# Patient Record
Sex: MALE | Race: WHITE | NOT HISPANIC OR LATINO | URBAN - METROPOLITAN AREA
[De-identification: names, ages, dates, MRNs, and addresses within clinical notes are randomized per-mention and may not be internally consistent; named-entity substitution may affect disease eponyms.]

---

## 2023-01-24 PROBLEM — M25.512 LEFT SHOULDER PAIN: Status: ACTIVE | Noted: 2023-01-24

## 2023-01-24 PROBLEM — M25.539 WRIST PAIN: Status: ACTIVE | Noted: 2023-01-24

## 2023-01-24 PROBLEM — H52.4 PRESBYOPIA: Status: ACTIVE | Noted: 2023-01-24

## 2023-01-24 PROBLEM — R07.9 CHEST PAIN: Status: ACTIVE | Noted: 2023-01-24

## 2023-01-24 PROBLEM — I10 HYPERTENSION: Status: ACTIVE | Noted: 2023-01-24

## 2023-01-24 PROBLEM — Z97.3 WEARS READING EYEGLASSES: Status: ACTIVE | Noted: 2023-01-24

## 2023-01-24 PROBLEM — N52.9 ED (ERECTILE DYSFUNCTION): Status: ACTIVE | Noted: 2023-01-24

## 2023-01-24 PROBLEM — H53.8 FLASHING LIGHTS SEEN: Status: ACTIVE | Noted: 2023-01-24

## 2023-01-24 PROBLEM — M19.90 OSTEOARTHRITIS: Status: ACTIVE | Noted: 2023-01-24

## 2023-01-24 PROBLEM — K64.9 HEMORRHOID: Status: ACTIVE | Noted: 2023-01-24

## 2023-01-24 PROBLEM — R53.83 FATIGUE: Status: ACTIVE | Noted: 2023-01-24

## 2023-01-24 PROBLEM — F32.A DEPRESSION: Status: ACTIVE | Noted: 2023-01-24

## 2023-01-24 PROBLEM — D22.9 NUMEROUS MOLES: Status: ACTIVE | Noted: 2023-01-24

## 2023-01-24 PROBLEM — N48.6 PEYRONIE DISEASE: Status: ACTIVE | Noted: 2023-01-24

## 2023-01-24 PROBLEM — E78.5 HYPERLIPIDEMIA: Status: ACTIVE | Noted: 2023-01-24

## 2023-01-24 PROBLEM — M25.521 RIGHT ELBOW PAIN: Status: ACTIVE | Noted: 2023-01-24

## 2023-01-24 PROBLEM — J20.9 ACUTE BRONCHITIS: Status: ACTIVE | Noted: 2023-01-24

## 2023-01-24 PROBLEM — H52.7 REFRACTIVE ERROR: Status: ACTIVE | Noted: 2023-01-24

## 2023-01-24 PROBLEM — M54.2 NECK PAIN: Status: ACTIVE | Noted: 2023-01-24

## 2023-01-24 PROBLEM — R10.9 ABDOMINAL PAIN: Status: ACTIVE | Noted: 2023-01-24

## 2023-01-24 PROBLEM — E56.9 VITAMIN DEFICIENCY: Status: ACTIVE | Noted: 2023-01-24

## 2023-01-24 PROBLEM — R32 URINARY INCONTINENCE: Status: ACTIVE | Noted: 2023-01-24

## 2023-01-24 PROBLEM — H25.813 MIXED TYPE AGE-RELATED CATARACT, BOTH EYES: Status: ACTIVE | Noted: 2023-01-24

## 2023-01-24 PROBLEM — H43.812 PVD (POSTERIOR VITREOUS DETACHMENT), LEFT EYE: Status: ACTIVE | Noted: 2023-01-24

## 2023-01-24 PROBLEM — M18.11 LOCALIZED PRIMARY OSTEOARTHRITIS OF CARPOMETACARPAL JOINT OF RIGHT THUMB: Status: ACTIVE | Noted: 2023-01-24

## 2023-01-24 PROBLEM — H25.13 CATARACT, NUCLEAR SCLEROTIC, BOTH EYES: Status: ACTIVE | Noted: 2023-01-24

## 2023-01-24 PROBLEM — H43.819 POSTERIOR VITREOUS DETACHMENT: Status: ACTIVE | Noted: 2023-01-24

## 2023-01-24 RX ORDER — FLUTICASONE PROPIONATE AND SALMETEROL 100; 50 UG/1; UG/1
1 POWDER RESPIRATORY (INHALATION) EVERY 12 HOURS
COMMUNITY
End: 2023-04-21

## 2023-01-24 RX ORDER — ALBUTEROL SULFATE 90 UG/1
1 AEROSOL, METERED RESPIRATORY (INHALATION) EVERY 4 HOURS PRN
COMMUNITY
End: 2023-11-26

## 2023-01-24 RX ORDER — TAMSULOSIN HYDROCHLORIDE 0.4 MG/1
0.4 CAPSULE ORAL NIGHTLY
COMMUNITY
Start: 2018-08-21 | End: 2023-05-04

## 2023-01-24 RX ORDER — OMEPRAZOLE 20 MG/1
20 TABLET, DELAYED RELEASE ORAL
COMMUNITY

## 2023-01-24 RX ORDER — TADALAFIL 5 MG/1
5 TABLET ORAL AS NEEDED
COMMUNITY
Start: 2019-06-06 | End: 2023-03-09 | Stop reason: ALTCHOICE

## 2023-01-24 RX ORDER — LOSARTAN POTASSIUM 50 MG/1
50 TABLET ORAL DAILY
COMMUNITY
Start: 2019-07-02 | End: 2023-05-04

## 2023-01-24 RX ORDER — MONTELUKAST SODIUM 10 MG/1
10 TABLET ORAL DAILY
COMMUNITY
End: 2023-05-04

## 2023-01-24 RX ORDER — ROSUVASTATIN CALCIUM 10 MG/1
10 TABLET, COATED ORAL NIGHTLY
COMMUNITY
End: 2023-05-04

## 2023-03-08 ENCOUNTER — APPOINTMENT (OUTPATIENT)
Dept: PRIMARY CARE | Facility: CLINIC | Age: 68
End: 2023-03-08

## 2023-03-09 ENCOUNTER — OFFICE VISIT (OUTPATIENT)
Dept: PRIMARY CARE | Facility: CLINIC | Age: 68
End: 2023-03-09
Payer: COMMERCIAL

## 2023-03-09 VITALS
SYSTOLIC BLOOD PRESSURE: 122 MMHG | TEMPERATURE: 96.4 F | BODY MASS INDEX: 34.43 KG/M2 | HEART RATE: 80 BPM | HEIGHT: 72 IN | DIASTOLIC BLOOD PRESSURE: 69 MMHG | RESPIRATION RATE: 14 BRPM | WEIGHT: 254.2 LBS

## 2023-03-09 DIAGNOSIS — E78.5 HYPERLIPIDEMIA, UNSPECIFIED HYPERLIPIDEMIA TYPE: ICD-10-CM

## 2023-03-09 DIAGNOSIS — N48.6 PEYRONIE DISEASE: Primary | ICD-10-CM

## 2023-03-09 DIAGNOSIS — I10 PRIMARY HYPERTENSION: ICD-10-CM

## 2023-03-09 DIAGNOSIS — G56.00 CARPAL TUNNEL SYNDROME, UNSPECIFIED LATERALITY: ICD-10-CM

## 2023-03-09 PROCEDURE — 3078F DIAST BP <80 MM HG: CPT | Performed by: INTERNAL MEDICINE

## 2023-03-09 PROCEDURE — 3074F SYST BP LT 130 MM HG: CPT | Performed by: INTERNAL MEDICINE

## 2023-03-09 PROCEDURE — 1159F MED LIST DOCD IN RCRD: CPT | Performed by: INTERNAL MEDICINE

## 2023-03-09 PROCEDURE — 1036F TOBACCO NON-USER: CPT | Performed by: INTERNAL MEDICINE

## 2023-03-09 PROCEDURE — 99213 OFFICE O/P EST LOW 20 MIN: CPT | Performed by: INTERNAL MEDICINE

## 2023-03-09 RX ORDER — TRIAMCINOLONE ACETONIDE 1 MG/G
CREAM TOPICAL
COMMUNITY
Start: 2023-01-21 | End: 2023-09-14 | Stop reason: ALTCHOICE

## 2023-03-09 RX ORDER — LOSARTAN POTASSIUM 25 MG/1
1 TABLET ORAL DAILY
COMMUNITY
Start: 2022-11-13 | End: 2023-03-09 | Stop reason: ALTCHOICE

## 2023-03-09 RX ORDER — MUPIROCIN 20 MG/G
OINTMENT TOPICAL
COMMUNITY
Start: 2023-01-21 | End: 2023-09-14 | Stop reason: ALTCHOICE

## 2023-03-09 RX ORDER — MELOXICAM 15 MG/1
15 TABLET ORAL DAILY
COMMUNITY
Start: 2023-02-15 | End: 2023-12-05

## 2023-03-09 ASSESSMENT — PATIENT HEALTH QUESTIONNAIRE - PHQ9
1. LITTLE INTEREST OR PLEASURE IN DOING THINGS: NOT AT ALL
SUM OF ALL RESPONSES TO PHQ9 QUESTIONS 1 AND 2: 0
2. FEELING DOWN, DEPRESSED OR HOPELESS: NOT AT ALL

## 2023-03-09 ASSESSMENT — PAIN SCALES - GENERAL: PAINLEVEL: 0-NO PAIN

## 2023-03-09 NOTE — PROGRESS NOTES
The patient is here for:   Chief Complaint   Patient presents with    3 month Routine Check Up     Per patient      Patient's PCP is: Brian Alves MD    I have reviewed existing histories, notes, past medical history, surgical history, social history, family history, med list, allergy list, and immunization, and updated if applicable.    HPI:  Follow up weight and bp. He started cutting back on sugar salt, and has started walking on treadmill. Feels well. Not Weight loss yet. But is hopefull  Labs 3 months ago showed pre dm  Pt requested referrel to see urology for Peyronie disease        Additional ROS: recently dx of cts, wearing brace at night, helping. Spider bite on left thigh and left hand, went to urgent care and is getting better    Physical exam:  /69 (BP Location: Left arm, Patient Position: Sitting, BP Cuff Size: Large adult)   Pulse 80   Temp 35.8 °C (96.4 °F) (Temporal)   Resp 14   Ht 1.829 m (6')   Wt 115 kg (254 lb 3.2 oz)   BMI 34.48 kg/m²    Patient appears well, alert and oriented x 3, cooperative. No depression or anxiety.   Vitals are as documented.  Neck is supple and free of adenopathy, or masses. No thyromegaly.   PERRL, extra eye muscles are intact.  Ears, throat are normal.  Heart sounds are normal, no murmur, gallops or rubs.   Lungs are clear to auscultation    Abdomen is soft, no tenderness, masses or organomegaly.  Extremities are normal. Peripheral pulses are normal.   Neurologic exam is normal without focal findings.   Skin is normal without suspicious lesions noted.   No acute joint swelling or pain.      Assessments/orders:   1. Primary hypertension  controlled    2. Peyronie disease     - Referral to Urology; Future    3. Hyperlipidemia, unspecified hyperlipidemia type   Continue diet and exercise                  Follow up 6 months

## 2023-03-09 NOTE — PROGRESS NOTES
Patient identified by name and date of birth. Vitals taken, allergies, medications and immunizations reviewed.    Patient ready to be seen by provider.

## 2023-04-20 DIAGNOSIS — J45.909 UNSPECIFIED ASTHMA, UNCOMPLICATED (HHS-HCC): ICD-10-CM

## 2023-04-21 RX ORDER — CEPHALEXIN 250 MG/1
CAPSULE ORAL
Qty: 60 EACH | Refills: 3 | Status: SHIPPED | OUTPATIENT
Start: 2023-04-21 | End: 2024-01-21

## 2023-05-02 DIAGNOSIS — E78.5 HYPERLIPIDEMIA, UNSPECIFIED: ICD-10-CM

## 2023-05-02 DIAGNOSIS — R32 UNSPECIFIED URINARY INCONTINENCE: ICD-10-CM

## 2023-05-02 DIAGNOSIS — J45.990 EXERCISE INDUCED BRONCHOSPASM (HHS-HCC): ICD-10-CM

## 2023-05-02 DIAGNOSIS — I10 ESSENTIAL (PRIMARY) HYPERTENSION: ICD-10-CM

## 2023-05-04 RX ORDER — TAMSULOSIN HYDROCHLORIDE 0.4 MG/1
CAPSULE ORAL
Qty: 90 CAPSULE | Refills: 1 | Status: SHIPPED | OUTPATIENT
Start: 2023-05-04

## 2023-05-04 RX ORDER — ROSUVASTATIN CALCIUM 10 MG/1
TABLET, COATED ORAL
Qty: 90 TABLET | Refills: 1 | Status: SHIPPED | OUTPATIENT
Start: 2023-05-04 | End: 2023-11-26

## 2023-05-04 RX ORDER — LOSARTAN POTASSIUM 50 MG/1
TABLET ORAL
Qty: 90 TABLET | Refills: 1 | Status: SHIPPED | OUTPATIENT
Start: 2023-05-04 | End: 2023-11-26

## 2023-05-04 RX ORDER — MONTELUKAST SODIUM 10 MG/1
TABLET ORAL
Qty: 90 TABLET | Refills: 1 | Status: SHIPPED | OUTPATIENT
Start: 2023-05-04 | End: 2023-11-26

## 2023-07-07 LAB
APPEARANCE, URINE: CLEAR
BILIRUBIN, URINE: NEGATIVE
BLOOD, URINE: NEGATIVE
COLOR, URINE: YELLOW
GLUCOSE, URINE: NEGATIVE MG/DL
KETONES, URINE: NEGATIVE MG/DL
LEUKOCYTE ESTERASE, URINE: NEGATIVE
NITRITE, URINE: NEGATIVE
PH, URINE: 6 (ref 5–8)
PROTEIN, URINE: NEGATIVE MG/DL
SPECIFIC GRAVITY, URINE: 1.03 (ref 1–1.03)
UROBILINOGEN, URINE: <2 MG/DL (ref 0–1.9)

## 2023-07-08 LAB — URINE CULTURE: NO GROWTH

## 2023-09-05 PROBLEM — R35.0 URINARY FREQUENCY: Status: ACTIVE | Noted: 2023-09-05

## 2023-09-05 PROBLEM — J45.990 EXERCISE-INDUCED ASTHMA (HHS-HCC): Status: ACTIVE | Noted: 2023-09-05

## 2023-09-05 PROBLEM — R35.0 BENIGN PROSTATIC HYPERPLASIA WITH URINARY FREQUENCY: Status: ACTIVE | Noted: 2023-09-05

## 2023-09-05 PROBLEM — R20.2 NUMBNESS AND TINGLING IN BOTH HANDS: Status: ACTIVE | Noted: 2023-09-05

## 2023-09-05 PROBLEM — K21.9 GERD WITHOUT ESOPHAGITIS: Status: ACTIVE | Noted: 2023-09-05

## 2023-09-05 PROBLEM — M19.049 HAND ARTHRITIS: Status: ACTIVE | Noted: 2023-09-05

## 2023-09-05 PROBLEM — E66.811 OBESITY (BMI 30.0-34.9): Status: ACTIVE | Noted: 2023-09-05

## 2023-09-05 PROBLEM — E66.9 OBESITY (BMI 30.0-34.9): Status: ACTIVE | Noted: 2023-09-05

## 2023-09-05 PROBLEM — J45.909 ASTHMA (HHS-HCC): Status: ACTIVE | Noted: 2023-09-05

## 2023-09-05 PROBLEM — M25.561 KNEE PAIN, BILATERAL: Status: ACTIVE | Noted: 2023-09-05

## 2023-09-05 PROBLEM — N40.1 BENIGN PROSTATIC HYPERPLASIA WITH URINARY FREQUENCY: Status: ACTIVE | Noted: 2023-09-05

## 2023-09-05 PROBLEM — M25.519 SHOULDER PAIN: Status: ACTIVE | Noted: 2023-09-05

## 2023-09-05 PROBLEM — R20.0 NUMBNESS AND TINGLING IN BOTH HANDS: Status: ACTIVE | Noted: 2023-09-05

## 2023-09-05 PROBLEM — N39.41 URGENCY INCONTINENCE: Status: ACTIVE | Noted: 2023-09-05

## 2023-09-05 PROBLEM — M25.562 KNEE PAIN, BILATERAL: Status: ACTIVE | Noted: 2023-09-05

## 2023-09-07 ENCOUNTER — HOSPITAL ENCOUNTER (OUTPATIENT)
Dept: DATA CONVERSION | Facility: HOSPITAL | Age: 68
End: 2023-09-07
Attending: STUDENT IN AN ORGANIZED HEALTH CARE EDUCATION/TRAINING PROGRAM | Admitting: STUDENT IN AN ORGANIZED HEALTH CARE EDUCATION/TRAINING PROGRAM
Payer: COMMERCIAL

## 2023-09-07 DIAGNOSIS — K62.89 OTHER SPECIFIED DISEASES OF ANUS AND RECTUM: ICD-10-CM

## 2023-09-07 DIAGNOSIS — Z12.11 ENCOUNTER FOR SCREENING FOR MALIGNANT NEOPLASM OF COLON: ICD-10-CM

## 2023-09-07 DIAGNOSIS — Z86.010 PERSONAL HISTORY OF COLONIC POLYPS: ICD-10-CM

## 2023-09-07 DIAGNOSIS — K64.4 RESIDUAL HEMORRHOIDAL SKIN TAGS: ICD-10-CM

## 2023-09-07 DIAGNOSIS — K63.5 POLYP OF COLON: ICD-10-CM

## 2023-09-07 DIAGNOSIS — K64.8 OTHER HEMORRHOIDS: ICD-10-CM

## 2023-09-07 DIAGNOSIS — D12.2 BENIGN NEOPLASM OF ASCENDING COLON: ICD-10-CM

## 2023-09-07 DIAGNOSIS — K57.30 DIVERTICULOSIS OF LARGE INTESTINE WITHOUT PERFORATION OR ABSCESS WITHOUT BLEEDING: ICD-10-CM

## 2023-09-07 DIAGNOSIS — D12.5 BENIGN NEOPLASM OF SIGMOID COLON: ICD-10-CM

## 2023-09-07 DIAGNOSIS — D12.3 BENIGN NEOPLASM OF TRANSVERSE COLON: ICD-10-CM

## 2023-09-13 LAB
COMPLETE PATHOLOGY REPORT: NORMAL
CONVERTED CLINICAL DIAGNOSIS-HISTORY: NORMAL
CONVERTED FINAL DIAGNOSIS: NORMAL
CONVERTED FINAL REPORT PDF LINK TO COPY AND PASTE: NORMAL
CONVERTED GROSS DESCRIPTION: NORMAL

## 2023-09-13 RX ORDER — TROSPIUM CHLORIDE ER 60 MG/1
60 CAPSULE ORAL DAILY
COMMUNITY
Start: 2023-08-30 | End: 2024-04-16 | Stop reason: WASHOUT

## 2023-09-13 RX ORDER — CHLORHEXIDINE GLUCONATE ORAL RINSE 1.2 MG/ML
SOLUTION DENTAL
COMMUNITY
Start: 2023-06-28 | End: 2023-09-14 | Stop reason: ALTCHOICE

## 2023-09-13 RX ORDER — TADALAFIL 5 MG/1
5 TABLET ORAL DAILY
COMMUNITY
Start: 2023-08-07 | End: 2024-01-23 | Stop reason: SDUPTHER

## 2023-09-14 ENCOUNTER — APPOINTMENT (OUTPATIENT)
Dept: PRIMARY CARE | Facility: CLINIC | Age: 68
End: 2023-09-14
Payer: COMMERCIAL

## 2023-09-14 ENCOUNTER — OFFICE VISIT (OUTPATIENT)
Dept: PRIMARY CARE | Facility: CLINIC | Age: 68
End: 2023-09-14
Payer: COMMERCIAL

## 2023-09-14 VITALS
TEMPERATURE: 98.3 F | OXYGEN SATURATION: 94 % | HEIGHT: 72 IN | HEART RATE: 81 BPM | SYSTOLIC BLOOD PRESSURE: 156 MMHG | WEIGHT: 264 LBS | BODY MASS INDEX: 35.76 KG/M2 | DIASTOLIC BLOOD PRESSURE: 85 MMHG

## 2023-09-14 DIAGNOSIS — E78.5 HYPERLIPIDEMIA, UNSPECIFIED HYPERLIPIDEMIA TYPE: ICD-10-CM

## 2023-09-14 DIAGNOSIS — Z12.5 SCREENING FOR PROSTATE CANCER: ICD-10-CM

## 2023-09-14 DIAGNOSIS — E66.9 OBESITY (BMI 30.0-34.9): ICD-10-CM

## 2023-09-14 DIAGNOSIS — F33.9 DEPRESSION, RECURRENT (CMS-HCC): ICD-10-CM

## 2023-09-14 DIAGNOSIS — I10 PRIMARY HYPERTENSION: ICD-10-CM

## 2023-09-14 DIAGNOSIS — R73.03 PRE-DIABETES: ICD-10-CM

## 2023-09-14 DIAGNOSIS — G47.33 OSA (OBSTRUCTIVE SLEEP APNEA): Primary | ICD-10-CM

## 2023-09-14 PROCEDURE — 3077F SYST BP >= 140 MM HG: CPT | Performed by: INTERNAL MEDICINE

## 2023-09-14 PROCEDURE — 1159F MED LIST DOCD IN RCRD: CPT | Performed by: INTERNAL MEDICINE

## 2023-09-14 PROCEDURE — 1036F TOBACCO NON-USER: CPT | Performed by: INTERNAL MEDICINE

## 2023-09-14 PROCEDURE — 3079F DIAST BP 80-89 MM HG: CPT | Performed by: INTERNAL MEDICINE

## 2023-09-14 PROCEDURE — 1126F AMNT PAIN NOTED NONE PRSNT: CPT | Performed by: INTERNAL MEDICINE

## 2023-09-14 PROCEDURE — 99214 OFFICE O/P EST MOD 30 MIN: CPT | Performed by: INTERNAL MEDICINE

## 2023-09-14 ASSESSMENT — ENCOUNTER SYMPTOMS
DEPRESSION: 0
OCCASIONAL FEELINGS OF UNSTEADINESS: 0
LOSS OF SENSATION IN FEET: 0

## 2023-09-14 NOTE — PROGRESS NOTES
PCP: Brian Alves MD   I have reviewed existing histories, notes, past medical history, surgical history, social history, family history, med list, allergy list, and immunization, and updated.    HPI:   Had Colonoscopy recently, showed adenoma. Go back in 5 years. During procedure, anesthesiologist noted Sleep apnea. They referred him to see sleep med. He is to schedule appointment     Right thumb OA, getting bad. Sees hand ortho. Will call them to schedule  Gained weight.  Difficulty following diet.  Will try to do better  He recently joined a gym.      Lab Results   Component Value Date    WBC 8.4 12/28/2022    HGB 14.1 12/28/2022    HCT 42.6 12/28/2022     12/28/2022    CHOL 152 12/28/2022    TRIG 142 12/28/2022    HDL 34.3 (A) 12/28/2022    ALT 23 12/28/2022    AST 17 12/28/2022     12/28/2022    K 4.4 12/28/2022     12/28/2022    CREATININE 1.07 12/28/2022    BUN 22 12/28/2022    CO2 28 12/28/2022    TSH 1.05 09/30/2021    PSA 2.48 02/19/2020    HGBA1C 6.2 (A) 12/28/2022     Physical exam:  /85   Pulse 81   Temp 36.8 °C (98.3 °F)   Ht 1.829 m (6')   Wt 120 kg (264 lb)   SpO2 94%   BMI 35.80 kg/m²     NAD  No carotid bruit  Neck exam showed no mass, lymphadenopathy, or thyroid enlargement, and no carotid bruit.  Heart exam showed normal heart sounds, no murmur, clicks, gallops or rubs. Regular rate and rhythm. Chest is clear; no wheezes or rales.  No leg edema  Central obesity    Assessments/orders:   1. SERGIO (obstructive sleep apnea)  CBC      2. Obesity (BMI 30.0-34.9)  CBC      3. Hyperlipidemia, unspecified hyperlipidemia type  CBC, Comprehensive Metabolic Panel, Lipid Panel      4. Primary hypertension  CBC, Comprehensive Metabolic Panel      5. Screening for prostate cancer  Prostate Specific Antigen, Screen      6. Pre-diabetes  CBC, Hemoglobin A1C      7. Depression, recurrent (CMS/HCC)

## 2023-09-29 VITALS — WEIGHT: 264.55 LBS | BODY MASS INDEX: 33.97 KG/M2

## 2023-10-24 DIAGNOSIS — M25.512 PAIN IN LEFT SHOULDER: ICD-10-CM

## 2023-10-24 RX ORDER — MELOXICAM 15 MG/1
15 TABLET ORAL DAILY
Qty: 90 TABLET | OUTPATIENT
Start: 2023-10-24

## 2023-11-02 ENCOUNTER — OFFICE VISIT (OUTPATIENT)
Dept: SLEEP MEDICINE | Facility: CLINIC | Age: 68
End: 2023-11-02
Payer: COMMERCIAL

## 2023-11-02 VITALS
SYSTOLIC BLOOD PRESSURE: 128 MMHG | DIASTOLIC BLOOD PRESSURE: 82 MMHG | OXYGEN SATURATION: 95 % | BODY MASS INDEX: 36.14 KG/M2 | WEIGHT: 266.8 LBS | HEIGHT: 72 IN | HEART RATE: 95 BPM

## 2023-11-02 DIAGNOSIS — R29.818 SUSPECTED SLEEP APNEA: Primary | ICD-10-CM

## 2023-11-02 PROCEDURE — 3074F SYST BP LT 130 MM HG: CPT | Performed by: NURSE PRACTITIONER

## 2023-11-02 PROCEDURE — 1160F RVW MEDS BY RX/DR IN RCRD: CPT | Performed by: NURSE PRACTITIONER

## 2023-11-02 PROCEDURE — 99204 OFFICE O/P NEW MOD 45 MIN: CPT | Performed by: NURSE PRACTITIONER

## 2023-11-02 PROCEDURE — 3079F DIAST BP 80-89 MM HG: CPT | Performed by: NURSE PRACTITIONER

## 2023-11-02 PROCEDURE — 1036F TOBACCO NON-USER: CPT | Performed by: NURSE PRACTITIONER

## 2023-11-02 PROCEDURE — 1126F AMNT PAIN NOTED NONE PRSNT: CPT | Performed by: NURSE PRACTITIONER

## 2023-11-02 PROCEDURE — 1159F MED LIST DOCD IN RCRD: CPT | Performed by: NURSE PRACTITIONER

## 2023-11-02 PROCEDURE — 99214 OFFICE O/P EST MOD 30 MIN: CPT | Performed by: NURSE PRACTITIONER

## 2023-11-02 ASSESSMENT — SLEEP AND FATIGUE QUESTIONNAIRES
ESS-CHAD TOTAL SCORE: 11
HOW LIKELY ARE YOU TO NOD OFF OR FALL ASLEEP WHILE SITTING AND TALKING TO SOMEONE: WOULD NEVER DOZE
HOW LIKELY ARE YOU TO NOD OFF OR FALL ASLEEP WHILE SITTING AND READING: MODERATE CHANCE OF DOZING
HOW LIKELY ARE YOU TO NOD OFF OR FALL ASLEEP WHEN YOU ARE A PASSENGER IN A CAR FOR AN HOUR WITHOUT A BREAK: MODERATE CHANCE OF DOZING
HOW LIKELY ARE YOU TO NOD OFF OR FALL ASLEEP WHILE LYING DOWN TO REST IN THE AFTERNOON WHEN CIRCUMSTANCES PERMIT: HIGH CHANCE OF DOZING
SLEEP_PROBLEM_NOTICEABLE_TO_OTHERS: MUCH
HOW LIKELY ARE YOU TO NOD OFF OR FALL ASLEEP WHILE WATCHING TV: MODERATE CHANCE OF DOZING
DIFFICULTY_STAYING_ASLEEP: SEVERE
SLEEP_PROBLEM_INTERFERES_DAILY_ACTIVITIES: VERY MUCH NOTICEABLE
SITING INACTIVE IN A PUBLIC PLACE LIKE A CLASS ROOM OR A MOVIE THEATER: MODERATE CHANCE OF DOZING
HOW LIKELY ARE YOU TO NOD OFF OR FALL ASLEEP IN A CAR, WHILE STOPPED FOR A FEW MINUTES IN TRAFFIC: WOULD NEVER DOZE
WAKING_TOO_EARLY: VERY SEVERE
WORRIED_DISTRESSED_DUE_TO_SLEEP: MUCH
HOW LIKELY ARE YOU TO NOD OFF OR FALL ASLEEP WHILE SITTING QUIETLY AFTER LUNCH WITHOUT ALCOHOL: WOULD NEVER DOZE
SATISFACTION_WITH_CURRENT_SLEEP_PATTERN: VERY DISSATISFIED

## 2023-11-02 ASSESSMENT — PAIN SCALES - GENERAL: PAINLEVEL: 0-NO PAIN

## 2023-11-02 ASSESSMENT — PATIENT HEALTH QUESTIONNAIRE - PHQ9
2. FEELING DOWN, DEPRESSED OR HOPELESS: NOT AT ALL
1. LITTLE INTEREST OR PLEASURE IN DOING THINGS: NOT AT ALL
SUM OF ALL RESPONSES TO PHQ9 QUESTIONS 1 AND 2: 0

## 2023-11-02 ASSESSMENT — ENCOUNTER SYMPTOMS
DEPRESSION: 0
LOSS OF SENSATION IN FEET: 0
OCCASIONAL FEELINGS OF UNSTEADINESS: 0

## 2023-11-02 ASSESSMENT — COLUMBIA-SUICIDE SEVERITY RATING SCALE - C-SSRS
6. HAVE YOU EVER DONE ANYTHING, STARTED TO DO ANYTHING, OR PREPARED TO DO ANYTHING TO END YOUR LIFE?: NO
1. IN THE PAST MONTH, HAVE YOU WISHED YOU WERE DEAD OR WISHED YOU COULD GO TO SLEEP AND NOT WAKE UP?: NO
2. HAVE YOU ACTUALLY HAD ANY THOUGHTS OF KILLING YOURSELF?: NO

## 2023-11-02 NOTE — PATIENT INSTRUCTIONS
I will be off on leave of absence January through April. Please schedule follow up with one of my colleagues if need to be seen during this time frame.    Dr. Dakota Vega, [place] [facility] Escalante or [place] and [place] Madrigal CNP- Chandan Vega and Rosa Peñaloza, ARIE, Chandan Vega, Whitney Guy. NP, Rogelio and [facility] Sleep Medicine  [facility] [id number] [facility] [address]       NAME: Hipolito Asencio   DATE:  11/02/23    DIAGNOSIS:   1. Suspected sleep apnea            Thank you for coming to the Sleep Medicine Clinic today! Your sleep medicine provider today was: HILARY Chong Below is a summary of your treatment plan, other important information, and our contact numbers:    TREATMENT PLAN:   - Get your sleep study scheduled  - Follow-up in 3-4 months.  - If not already done, sign up for 'My Chart' and send prescription requests or messages through this      Obstructive sleep apnea (SERGIO): SERGIO is a sleep disorder where your upper airway muscles relax during sleep and the airway intermittently and repetitively narrows and collapses leading to blocked airway (apnea) which, in turn, can disrupt breathing in sleep, lower oxygen levels while you sleep and cause night time wakings. Because apnea may cause higher carbon dioxide or low oxygen levels, untreated SERGIO can lead to heart arrhythmia, elevation of blood pressure, and make it harder for the body to consolidate memory and metabolize (leading to higher blood sugars at night).   Frequent partial arousals occur during sleep resulting in sleep deprivation and daytime sleepiness. SERGIO is associated with an increased risk of cardiovascular disease, stroke, hypertension, and insulin resistance. Moreover, untreated SERGIO with excessive daytime sleepiness can increase the risk of  motor vehicular accidents.    Some conservative strategies for SERGIO are:   Positional therapy - Avoid sleeping on your back.   Healthy diet, exercise, and optimizing weight encouraged.   Avoid alcohol late in the evening as it can make sleep apnea worse.     Safety: Avoid driving and operating heavy equipment while sleepy. Drowsy driving may lead to life-threatening motor vehicle accidents.     Common treatment options for sleep apnea include weight management, positional therapy, Positive Airway Therapy (PAP) therapy, oral appliance therapy, hypoglossal nerve stimulation, and select airway surgeries.     Instructions - Common SERGIO Recs: - For your sleep apnea, continue to use your PAP every night and use it whenever you are sleeping.   - Avoid alcohol or sedatives several hours prior to sleeping.   - Get additional supplies for your PAP (e.g., mask, hose, filters) every 3 months or as your insurance allows from your [employer] company. Replacement cushions for your PAP mask can be requested monthly if airseals are an issue.  - Remember to clean your mask, tubings, and water chamber regularly as instructed.  - Avoid driving or operating heavy machinery when drowsy. A person driving while sleepy is five (5) times more likely to have an accident. If you feel sleepy, pull over and take a short power nap (sleep for less than 30 minutes). Otherwise, ask somebody to drive you.    EASY WAYS TO IMPROVE YOUR SLEEP:  1. Go to bed and wake up at the same time every day.   Aim for 8 hours but some people need less, some need more.   Get out of bed if you are not sleeping.   Limit naps to 20 min or less.   2. Expose yourself to daylight and/ or bright light in the morning.   Go outside or spend time near a window each morning.   You can use a light box (found on Amazon) if you wake before the sunrise.   Limit light exposure in the evenings (including electronic usage).   Try meditation, reading, stretching, deep breathing, warm shower or  bath, or yoga nidra as part of your bedtime routine. There are many great FREE, videos or audio tracks on YouTube/ Lot[employer], etc for guidance.  3. Exercise, in some form, EVERY day, but not too close to bedtime. Consider making this part of your routine at the start of your day, followed by a cool shower.  4. Eat meals at roughly the same time every day. Make sure you are prioritizing fruits, vegetables, whole grains, lean proteins.  5. Time your caffeine intake. Make sure you are not drinking caffeine within 8 to 12 hours prior to your bedtime.   6. Avoid marijuana, alcohol, and nicotine. They will reduce sleep quality in any quantity.  7. Learn to manage anxiety. Psychology services at  can be reached at [phone] to schedule an appointment.     IMPORTANT INFORMATION:     Call [phone] for medical emergencies.  Our offices are generally open from Monday-Friday, 9 am - 5 pm.  If you need to get in touch with me, you may either call me and my team(number is below) or you can use [facility] How To.  If a referral for a test, for CPAP, or for another specialist was made, and you have not heard about scheduling this within a week, please call scheduling at [phone]).  If you are unable to make your appointment for clinic or an overnight study, kindly call the office at least 48 hours in advance to cancel and reschedule.  If you are on CPAP, please bring your device's card to each clinic appointment unless told otherwise by your provider.  There are no supporting services by either the sleep doctors or their staff on weekends and Holidays, or after 5 PM on weekdays.   If you have been asked to come to a sleep study, make sure you bring toiletries, a comfy pillow, and any nighttime medications that you may regularly take. Also be sure to eat dinner before you arrive. We generally do not provide meals.      PRESCRIPTIONS:  We require 7 days advanced notice for prescription refills. If we do not receive the request in this  time, we cannot guarantee that your medication will be refilled in time. Please contact the sleep nurses listed below for refills or request via [employer].     IMPORTANT PHONE NUMBERS:   Sleep Medicine Clinic Fax: [phone]  Appointments (for Pediatric Sleep Clinic): [phone] - option 1  Appointments (for Adult Sleep Clinic): [phone] - option 2  Appointments (For Sleep Studies): [phone] - option 3  Behavioral Sleep Medicine: [phone]  Sleep Surgery: [phone]  ENT (Otolaryngology): [phone]  Headache Clinic (Neurology): [phone]  Neurology: [phone]  Psychiatry: [phone]  Pulmonary Function Testing (PFT) Center: [phone]  Pulmonary Medicine: [phone]  [facility] (DME): [phone]  [facility] (DME): [phone]  [facility] (DME): [phone]    Our Adult Sleep Medicine Team (Please do not hesitate to call the office or sleep nurse with any questions between appointments):    Adult Sleep Nurses (Shakira Farrar, RN and Nikki Hernandez RN):  For clinical questions and refilling prescriptions: [phone]  Email sleep diaries and other documents at: [email or web address]    Adult Sleep Medicine Secretaries:  Divya Bob (For Neo/Fish/Krise/[place]ro[place]/Yeh/Madrigal):   P: [phone]  F: [phone]  Angelia Mack (For [place]): P: [phone]  Fax: [phone]  Katharine Wood (For Jurcevic/Blank): P: [phone]  F: [phone]  Shana Scruggs (For [place]): P: [phone]  F: [phone]  Jaclyn Tucker (For Lilia/Malena/Zakhary): P: [phone]  F: [phone]  Kandi Barone (For Boris/Long): P: [phone]  F: [phone]     Adult Sleep Medicine Advanced Practice Providers:  Ezra Tolentino ([place], [place])  Betzaida Guy ([facility])  Patricia Vanegas CNP ([facility])  Sonal Peñaloza CNP ([place], [place], [facility])  Jess Yadav ([facility],  "[place], [place])  Solo Alves CNP ([place], [place])      Our Sleep Testing Center (STC) Locations:  Our team will contact you to schedule your sleep study, however, you can contact us as follow:  Main Phone Line (scheduling only): [phone]), option 3  Adult and Pediatric Locations  [facility] (6 years and older): [facility] - 4th floor ([address], [facility]) After hours line: [phone]  [facility] (Main campus: All ages): [facility], 6th floor. After hours line: [phone]   [place] (5 years and older; younger considered on case-by-case basis): [address]; [address], [address]. Scheduling  After hours line: [phone]   [place] (6 years and older): [address]; [address]; [address]   [place] (6 years and older): [address]  After hours line: [phone]   [place] (13 years and older) in [place]: [address], 2nd floor  After hours line: [phone]  [facility] (13 year and older): [address]  After hours line: [phone] (Home studies out of [facility])    Adult Only Locations:   [place] (18 years and older): [address], 2nd floor   [place] (18 years and older): [address]; 4th floor  After hours line: [phone]  [facility] (18 years and older) at [place]: [address]  After hours line: [phone]        CONTACTING YOUR SLEEP MEDICINE PROVIDER:  Send a message directly to your provider through \"My Chart\", which is the email service through your  Records Account: [email or web address]   Call [phone] and leave a message. One of the administrative assistants will forward the message to your sleep medicine provider through \"My Chart\" and/or email.     Your sleep medicine provider for this visit was: LATHA Chong-CNP    In the event that " you are running more than 15 minutes late to your appointment, I will kindly ask you to reschedule.

## 2023-11-02 NOTE — ASSESSMENT & PLAN NOTE
Ordered HSAT testing to be completed via [facility] for PAP pending results  Discussed Inspire as alternative today as well  Would not be a candidate for OAT due to dentures   Follow up with Dr. Escalante in clinic 3-4 mo [place]

## 2023-11-02 NOTE — PROGRESS NOTES
Patient: Hipolito Asencio    [id number]  : 1955 -- AGE 67 y.o.    Provider: HILARY Chong     Location [facility]   Service Date: 2023              [facility] Sleep Medicine Clinic  New Visit Note        HISTORY OF PRESENT ILLNESS     The patient's referring provider is: Dr. Brian Alves    HISTORY OF PRESENT ILLNESS   Mike Asencio is a 67 y.o. male who presents to a [facility] Sleep Medicine Clinic for a sleep medicine evaluation with concerns of SERGIO    The patient has h/o hyperlipidemia, HTN, obesity, cataracts, GERD, ED, Peyronie disease, depression, asthma.    Past Sleep History  Patient has the following sleep-related diagnoses: no previous testing    Current History    On today's visit, the patient reports snoring, witnessed apnea, reports apneic episodes during recent colonoscopy- was recommended testing.     Sleep schedule  on weekdays / work days:  Usual Bedtime:  12  am  Falls asleep around: 12:15 am  # of awakenings: 3x per night  Wake time:  7:30am    Naps: 2 pm 1 hour    Preferred sleeping position: side    Sleep-related ROS:    Problems going to sleep: restless legs    Sleep Maintenance: wakes up about 3-4x  times per night  Problems staying asleep Problems Staying Asleep: nocturia and no clear reason    Breathing during sleep: snoring, witnessed apneas, gasping/choking for air, and night sweats    RLS screen:  RLSSCREEN: - Sensations: Patient has unusual sensations in their extremities that cause an urge to move them   Pains in shoulders that wakes him  Restlessness in legs  Twitch and wakes up    Sleep-related behaviors:   dreams upon falling asleep  sleep paralysis  kicking, punching, or acting out dreams    Daytime Symptoms  On awakening patient reports: morning headaches, morning dry mouth, and morning sore throat    Fatigue: symptoms bothersome, but easily able to carry out all usual work/school/family  activities    ESS: 11   RODERCIK: 21  FOSQ:  27      REVIEW OF SYSTEMS     REVIEW OF SYSTEMS  Review of Systems   All other systems reviewed and are negative.          ALLERGIES AND MEDICATIONS     ALLERGIES  No Known Allergies    MEDICATIONS  Current Outpatient Medications   Medication Sig Dispense Refill    Advair Diskus 100-50 mcg/dose diskus inhaler INHALE 1 PUFF BY MOUTH EVERY 12 HOURS 60 each 3    albuterol 90 mcg/actuation inhaler Inhale 1 puff every 4 hours if needed.      losartan (Cozaar) 50 mg tablet TAKE 1 TABLET BY MOUTH EVERY DAY 90 tablet 1    meloxicam (Mobic) 15 mg tablet Take 1 tablet (15 mg) by mouth once daily. Take with food.      montelukast (Singulair) 10 mg tablet TAKE 1 TABLET BY MOUTH EVERY DAY AS DIRECTED 90 tablet 1    omeprazole OTC (PriLOSEC OTC) 20 mg EC tablet Take 1 tablet (20 mg) by mouth. Do not crush, chew, or split.      rosuvastatin (Crestor) 10 mg tablet TAKE 1 TABLET BY MOUTH EVERYDAY AT BEDTIME 90 tablet 1    tadalafil (Cialis) 5 mg tablet Take 1 tablet (5 mg) by mouth once daily.      tamsulosin (Flomax) 0.4 mg 24 hr capsule TAKE 1 CAPSULE BY MOUTH EVERYDAY AT BEDTIME 90 capsule 1    trospium (Sanctura XR) 60 mg 24 hour capsule Take 1 capsule (60 mg) by mouth once daily.       No current facility-administered medications for this visit.         PAST HISTORY     PAST MEDICAL HISTORY  Past Medical History:   Diagnosis Date    Personal history of other (healed) physical injury and trauma     History of corneal abrasion       PAST SURGICAL HISTORY:  Past Surgical History:   Procedure Laterality Date    OTHER SURGICAL HISTORY  01/11/2019    No history of surgery       FAMILY HISTORY  Family History   Problem Relation Name Age of Onset    Diabetes Other GRANDMOTHER        DOES/DOES NOT EC: does not have a family history of sleep disorder.      SOCIAL HISTORY  He  reports that he has never smoked. He has never used smokeless tobacco. He reports current alcohol use. He reports that he  does not use drugs. He currently lives alone.     Patient SOCIAL HISTORY : denies marijuana use  former smoker, quit 5 years ago  consumes 3 alcoholic beverages/week  consumes 3 caffeinated beverages/day    PHYSICAL EXAM     VITAL SIGNS: /82 (BP Location: Left arm, Patient Position: Sitting, BP Cuff Size: Large adult)   Pulse 95   Ht 1.829 m (6')   Wt 121 kg (266 lb 12.8 oz)   SpO2 95%   BMI 36.18 kg/m²      PREVIOUS WEIGHTS:  Wt Readings from Last 3 Encounters:   11/02/23 121 kg (266 lb 12.8 oz)   09/14/23 120 kg (264 lb)   06/30/23 118 kg (260 lb 8 oz)       Physical Exam  Physical Exam   Constitutional: Alert and oriented, cooperative, no obvious distress   HEENT: Non icteric or anemic, EOM WNL bilaterally     Upper Airway Examination:   Modified Mallampati Class: 2  OP Lateral wall narrowing ndgndrndanddndend:nd nd2nd Tonsil ndGndrndanddndend:nd nd2nd Edentulous  No high arched palate  Tongue Scalloping: N  Retrognathia: N  Overjet: N    Neck: Supple, no JVD, no goiter, no adenopathy  Chest: CTA bilaterally, no wheezing, crackles, rubs   Cardiac: RRR, S1 and S2, no murmur, rub, thrill   Abdomen: Obese, Soft, nontender, no masses, no organomegaly   Extremities: No clubbing, no LL edema   Neuromuscular: Cranial nerves grossly intact, no focal deficits        RESULTS/DATA     Bicarbonate (mmol/L)   Date Value   12/28/2022 28   09/30/2021 27   02/19/2020 28         ASSESSMENT/PLAN     Mr. Asencio is a 67 y.o. male and He was referred to the [facility] for evaluation of SERGIO    Problem List and Orders  Problem List Items Addressed This Visit             ICD-10-CM       Sleep    Suspected sleep apnea - Primary R29.818     Ordered HSAT testing to be completed via [place]  Plan for PAP pending results  Discussed Inspire as alternative today as well  Would not be a candidate for OAT due to dentures   Follow up with Dr. Escalatne in clinic 3-4 mo [place]         Relevant Orders    Home sleep apnea test (HSAT)         Dr. Escalante 3-4 mo

## 2023-11-25 DIAGNOSIS — E78.5 HYPERLIPIDEMIA, UNSPECIFIED: ICD-10-CM

## 2023-11-25 DIAGNOSIS — J45.990 EXERCISE INDUCED BRONCHOSPASM (HHS-HCC): ICD-10-CM

## 2023-11-25 DIAGNOSIS — I10 ESSENTIAL (PRIMARY) HYPERTENSION: ICD-10-CM

## 2023-11-26 RX ORDER — LOSARTAN POTASSIUM 50 MG/1
TABLET ORAL
Qty: 90 TABLET | Refills: 1 | Status: SHIPPED | OUTPATIENT
Start: 2023-11-26 | End: 2024-03-21 | Stop reason: DRUGHIGH

## 2023-11-26 RX ORDER — ROSUVASTATIN CALCIUM 10 MG/1
TABLET, COATED ORAL
Qty: 90 TABLET | Refills: 1 | Status: SHIPPED | OUTPATIENT
Start: 2023-11-26 | End: 2024-05-20

## 2023-11-26 RX ORDER — MONTELUKAST SODIUM 10 MG/1
TABLET ORAL
Qty: 90 TABLET | Refills: 1 | Status: SHIPPED | OUTPATIENT
Start: 2023-11-26

## 2023-11-26 RX ORDER — ALBUTEROL SULFATE 90 UG/1
1 AEROSOL, METERED RESPIRATORY (INHALATION) EVERY 4 HOURS PRN
Qty: 8 G | Refills: 3 | Status: SHIPPED | OUTPATIENT
Start: 2023-11-26

## 2023-11-27 ENCOUNTER — LAB (OUTPATIENT)
Dept: LAB | Facility: LAB | Age: 68
End: 2023-11-27
Payer: COMMERCIAL

## 2023-11-27 DIAGNOSIS — R73.03 PRE-DIABETES: ICD-10-CM

## 2023-11-27 DIAGNOSIS — Z12.5 SCREENING FOR PROSTATE CANCER: ICD-10-CM

## 2023-11-27 DIAGNOSIS — I10 PRIMARY HYPERTENSION: ICD-10-CM

## 2023-11-27 DIAGNOSIS — E66.9 OBESITY (BMI 30.0-34.9): ICD-10-CM

## 2023-11-27 DIAGNOSIS — E78.5 HYPERLIPIDEMIA, UNSPECIFIED HYPERLIPIDEMIA TYPE: ICD-10-CM

## 2023-11-27 DIAGNOSIS — G47.33 OSA (OBSTRUCTIVE SLEEP APNEA): ICD-10-CM

## 2023-11-27 LAB
ALBUMIN SERPL BCP-MCNC: 4 G/DL (ref 3.4–5)
ALP SERPL-CCNC: 78 U/L (ref 33–136)
ALT SERPL W P-5'-P-CCNC: 28 U/L (ref 10–52)
ANION GAP SERPL CALC-SCNC: 14 MMOL/L (ref 10–20)
AST SERPL W P-5'-P-CCNC: 19 U/L (ref 9–39)
BILIRUB SERPL-MCNC: 0.4 MG/DL (ref 0–1.2)
BUN SERPL-MCNC: 18 MG/DL (ref 6–23)
CALCIUM SERPL-MCNC: 9.1 MG/DL (ref 8.6–10.6)
CHLORIDE SERPL-SCNC: 102 MMOL/L (ref 98–107)
CHOLEST SERPL-MCNC: 178 MG/DL (ref 0–199)
CHOLESTEROL/HDL RATIO: 4.5
CO2 SERPL-SCNC: 27 MMOL/L (ref 21–32)
CREAT SERPL-MCNC: 1.13 MG/DL (ref 0.5–1.3)
ERYTHROCYTE [DISTWIDTH] IN BLOOD BY AUTOMATED COUNT: 12.2 % (ref 11.5–14.5)
EST. AVERAGE GLUCOSE BLD GHB EST-MCNC: 148 MG/DL
GFR SERPL CREATININE-BSD FRML MDRD: 72 ML/MIN/1.73M*2
GLUCOSE SERPL-MCNC: 129 MG/DL (ref 74–99)
HBA1C MFR BLD: 6.8 %
HCT VFR BLD AUTO: 42.3 % (ref 41–52)
HDLC SERPL-MCNC: 39.2 MG/DL
HGB BLD-MCNC: 14 G/DL (ref 13.5–17.5)
LDLC SERPL CALC-MCNC: 75 MG/DL
MCH RBC QN AUTO: 30.4 PG (ref 26–34)
MCHC RBC AUTO-ENTMCNC: 33.1 G/DL (ref 32–36)
MCV RBC AUTO: 92 FL (ref 80–100)
NON HDL CHOLESTEROL: 139 MG/DL (ref 0–149)
NRBC BLD-RTO: 0 /100 WBCS (ref 0–0)
PLATELET # BLD AUTO: 211 X10*3/UL (ref 150–450)
POTASSIUM SERPL-SCNC: 4.3 MMOL/L (ref 3.5–5.3)
PROT SERPL-MCNC: 6.4 G/DL (ref 6.4–8.2)
PSA SERPL-MCNC: 2.91 NG/ML
RBC # BLD AUTO: 4.61 X10*6/UL (ref 4.5–5.9)
SODIUM SERPL-SCNC: 139 MMOL/L (ref 136–145)
TRIGL SERPL-MCNC: 320 MG/DL (ref 0–149)
VLDL: 64 MG/DL (ref 0–40)
WBC # BLD AUTO: 7.5 X10*3/UL (ref 4.4–11.3)

## 2023-11-27 PROCEDURE — 85027 COMPLETE CBC AUTOMATED: CPT

## 2023-11-27 PROCEDURE — 83036 HEMOGLOBIN GLYCOSYLATED A1C: CPT

## 2023-11-27 PROCEDURE — 36415 COLL VENOUS BLD VENIPUNCTURE: CPT

## 2023-11-27 PROCEDURE — G0103 PSA SCREENING: HCPCS

## 2023-11-27 PROCEDURE — 80061 LIPID PANEL: CPT

## 2023-11-27 PROCEDURE — 80053 COMPREHEN METABOLIC PANEL: CPT

## 2023-12-04 ENCOUNTER — OFFICE VISIT (OUTPATIENT)
Dept: PRIMARY CARE | Facility: CLINIC | Age: 68
End: 2023-12-04
Payer: COMMERCIAL

## 2023-12-04 VITALS
SYSTOLIC BLOOD PRESSURE: 143 MMHG | OXYGEN SATURATION: 92 % | WEIGHT: 269 LBS | DIASTOLIC BLOOD PRESSURE: 77 MMHG | TEMPERATURE: 98.3 F | HEART RATE: 93 BPM | BODY MASS INDEX: 36.44 KG/M2 | HEIGHT: 72 IN

## 2023-12-04 DIAGNOSIS — E78.5 HYPERLIPIDEMIA, UNSPECIFIED HYPERLIPIDEMIA TYPE: ICD-10-CM

## 2023-12-04 DIAGNOSIS — E66.9 OBESITY (BMI 30.0-34.9): ICD-10-CM

## 2023-12-04 DIAGNOSIS — I10 PRIMARY HYPERTENSION: ICD-10-CM

## 2023-12-04 DIAGNOSIS — Z00.00 MEDICARE ANNUAL WELLNESS VISIT, SUBSEQUENT: Primary | ICD-10-CM

## 2023-12-04 DIAGNOSIS — E11.9 TYPE 2 DIABETES MELLITUS WITHOUT COMPLICATION, WITHOUT LONG-TERM CURRENT USE OF INSULIN (MULTI): ICD-10-CM

## 2023-12-04 PROCEDURE — 3077F SYST BP >= 140 MM HG: CPT | Performed by: INTERNAL MEDICINE

## 2023-12-04 PROCEDURE — 1126F AMNT PAIN NOTED NONE PRSNT: CPT | Performed by: INTERNAL MEDICINE

## 2023-12-04 PROCEDURE — 1159F MED LIST DOCD IN RCRD: CPT | Performed by: INTERNAL MEDICINE

## 2023-12-04 PROCEDURE — 3044F HG A1C LEVEL LT 7.0%: CPT | Performed by: INTERNAL MEDICINE

## 2023-12-04 PROCEDURE — 1170F FXNL STATUS ASSESSED: CPT | Performed by: INTERNAL MEDICINE

## 2023-12-04 PROCEDURE — 1036F TOBACCO NON-USER: CPT | Performed by: INTERNAL MEDICINE

## 2023-12-04 PROCEDURE — 4010F ACE/ARB THERAPY RXD/TAKEN: CPT | Performed by: INTERNAL MEDICINE

## 2023-12-04 PROCEDURE — 1160F RVW MEDS BY RX/DR IN RCRD: CPT | Performed by: INTERNAL MEDICINE

## 2023-12-04 PROCEDURE — 3078F DIAST BP <80 MM HG: CPT | Performed by: INTERNAL MEDICINE

## 2023-12-04 PROCEDURE — G0439 PPPS, SUBSEQ VISIT: HCPCS | Performed by: INTERNAL MEDICINE

## 2023-12-04 PROCEDURE — 3048F LDL-C <100 MG/DL: CPT | Performed by: INTERNAL MEDICINE

## 2023-12-04 RX ORDER — METFORMIN HYDROCHLORIDE 500 MG/1
TABLET, EXTENDED RELEASE ORAL
Qty: 90 TABLET | Refills: 1 | Status: SHIPPED | OUTPATIENT
Start: 2023-12-04 | End: 2024-01-29

## 2023-12-04 ASSESSMENT — ACTIVITIES OF DAILY LIVING (ADL)
BATHING: INDEPENDENT
GROCERY_SHOPPING: INDEPENDENT
MANAGING_FINANCES: INDEPENDENT
TAKING_MEDICATION: INDEPENDENT
DOING_HOUSEWORK: INDEPENDENT
DRESSING: INDEPENDENT

## 2023-12-04 ASSESSMENT — PATIENT HEALTH QUESTIONNAIRE - PHQ9
1. LITTLE INTEREST OR PLEASURE IN DOING THINGS: NOT AT ALL
2. FEELING DOWN, DEPRESSED OR HOPELESS: NOT AT ALL
SUM OF ALL RESPONSES TO PHQ9 QUESTIONS 1 AND 2: 0

## 2023-12-04 NOTE — PROGRESS NOTES
"SUBJECTIVE:   Hipolito Asencio \"Mike\" is a 67 y.o. male presenting for his annual physical/wellness.  PCP: Brian Alves MD     Wellness.   Also Follow up on labs, Follow up obesity. Lab showed hba1c in range of DM. High TG.     Colon screenin2023, adenoma, to repeat in 5 years.   Prostate screening:   Lab Results   Component Value Date    PSA 2.48 2020     Vaccines: Up to date   Diet:  healthy. Will try to eat healthier, and hopefully to lose some weight  Exercises:   regularly. Will continue.    Lab Results   Component Value Date    HGBA1C 6.8 (H) 2023     Lab Results   Component Value Date    CREATININE 1.13 2023     Lab Results   Component Value Date    CHOL 178 2023    CHOL 152 2022    CHOL 234 (H) 2021     Lab Results   Component Value Date    HDL 39.2 2023    HDL 34.3 (A) 2022    HDL 45.1 2021     Lab Results   Component Value Date    LDLCALC 75 2023     Lab Results   Component Value Date    TRIG 320 (H) 2023    TRIG 142 2022    TRIG 133 2021     No components found for: \"CHOLHDL\"    ROS:  Feeling well. No dyspnea or chest pain on exertion. No abdominal pain, change in bowel habits, black or bloody stools. No urinary tract or prostatic symptoms. No neurological complaints.       OBJECTIVE:   The patient appears well, alert, oriented x 3, in no distress.   /77   Pulse 93   Temp 36.8 °C (98.3 °F)   Ht 1.829 m (6')   Wt 122 kg (269 lb)   SpO2 92%   BMI 36.48 kg/m²   ENT normal.  Neck supple. No adenopathy or thyromegaly. ISAIAS. Lungs are clear, good air entry, no wheezes, rhonchi or rales. S1 and S2 normal, no murmurs, regular rate and rhythm. Abdomen is soft without tenderness, guarding, mass or organomegaly. Extremities show no edema, normal peripheral pulses. Neurological is normal without focal findings.      1. Medicare annual wellness visit, subsequent        2. Obesity (BMI 30.0-34.9)        3. Primary " hypertension        4. Type 2 diabetes mellitus without complication, without long-term current use of insulin (CMS/[facility])  Hemoglobin A1C, metFORMIN XR (Glucophage-XR) 500 mg 24 hr tablet, Basic metabolic panel, Hemoglobin A1c      5. Hyperlipidemia, unspecified hyperlipidemia type  Lipid Panel         Start metformin for Dm, and hopefully helps lose some weight

## 2023-12-05 ENCOUNTER — OFFICE VISIT (OUTPATIENT)
Dept: RHEUMATOLOGY | Facility: CLINIC | Age: 68
End: 2023-12-05
Payer: COMMERCIAL

## 2023-12-05 VITALS — BODY MASS INDEX: 36.89 KG/M2 | WEIGHT: 272 LBS

## 2023-12-05 DIAGNOSIS — M19.041 PRIMARY OSTEOARTHRITIS OF BOTH HANDS: Primary | ICD-10-CM

## 2023-12-05 DIAGNOSIS — M19.042 PRIMARY OSTEOARTHRITIS OF BOTH HANDS: Primary | ICD-10-CM

## 2023-12-05 PROCEDURE — 99213 OFFICE O/P EST LOW 20 MIN: CPT | Performed by: INTERNAL MEDICINE

## 2023-12-05 PROCEDURE — 1126F AMNT PAIN NOTED NONE PRSNT: CPT | Performed by: INTERNAL MEDICINE

## 2023-12-05 PROCEDURE — 1159F MED LIST DOCD IN RCRD: CPT | Performed by: INTERNAL MEDICINE

## 2023-12-05 PROCEDURE — 1160F RVW MEDS BY RX/DR IN RCRD: CPT | Performed by: INTERNAL MEDICINE

## 2023-12-05 PROCEDURE — 1036F TOBACCO NON-USER: CPT | Performed by: INTERNAL MEDICINE

## 2023-12-05 PROCEDURE — 20600 DRAIN/INJ JOINT/BURSA W/O US: CPT | Performed by: INTERNAL MEDICINE

## 2023-12-05 RX ORDER — TRIAMCINOLONE ACETONIDE 40 MG/ML
40 INJECTION, SUSPENSION INTRA-ARTICULAR; INTRAMUSCULAR
Status: COMPLETED | OUTPATIENT
Start: 2023-12-05 | End: 2023-12-05

## 2023-12-05 RX ORDER — LIDOCAINE HYDROCHLORIDE 10 MG/ML
1 INJECTION INFILTRATION; PERINEURAL ONCE
Status: COMPLETED | OUTPATIENT
Start: 2023-12-05 | End: 2023-12-05

## 2023-12-05 RX ORDER — TRIAMCINOLONE ACETONIDE 40 MG/ML
40 INJECTION, SUSPENSION INTRA-ARTICULAR; INTRAMUSCULAR ONCE
Status: COMPLETED | OUTPATIENT
Start: 2023-12-05 | End: 2023-12-05

## 2023-12-05 RX ORDER — LIDOCAINE HYDROCHLORIDE 10 MG/ML
1 INJECTION INFILTRATION; PERINEURAL
Status: COMPLETED | OUTPATIENT
Start: 2023-12-05 | End: 2023-12-05

## 2023-12-05 RX ADMIN — LIDOCAINE HYDROCHLORIDE 10 MG: 10 INJECTION INFILTRATION; PERINEURAL at 13:42

## 2023-12-05 RX ADMIN — TRIAMCINOLONE ACETONIDE 40 MG: 40 INJECTION, SUSPENSION INTRA-ARTICULAR; INTRAMUSCULAR at 13:38

## 2023-12-05 RX ADMIN — TRIAMCINOLONE ACETONIDE 40 MG: 40 INJECTION, SUSPENSION INTRA-ARTICULAR; INTRAMUSCULAR at 13:43

## 2023-12-05 RX ADMIN — LIDOCAINE HYDROCHLORIDE 1 ML: 10 INJECTION INFILTRATION; PERINEURAL at 13:38

## 2023-12-05 NOTE — PROGRESS NOTES
"Subjective     LEGALPREFNAME@ is a 67 y.o. male who presents for Follow-up and Arthritis.  HPI: 67-year-old male with history of OA, bilateral CTS, exercise-induced asthma, HTN, dyslipidemia and obesity presented for follow-up.     He reports pain in both thumbs.  Right thumb hurts more than left.  Sometimes feels weakness in the right thumb.  He has stopped taking meloxicam couple months ago per gastroenterology recommendation.      Review of Systems   All other systems reviewed and are negative.    Objective         5/17/2023     8:24 AM 6/30/2023    11:27 AM 9/6/2023    11:36 AM 9/14/2023     8:54 AM 11/2/2023     2:19 PM 12/4/2023     2:58 PM 12/5/2023     8:41 AM   Vitals   Systolic    156 128 143    Diastolic    85 82 77    Heart Rate    81 95 93    Temp  36.5 °C (97.7 °F)  36.8 °C (98.3 °F)  36.8 °C (98.3 °F)    Height (in) 1.88 m (6' 2\") 1.88 m (6' 2\")  1.829 m (6') 1.829 m (6') 1.829 m (6')    Weight (lb) 253 260.5 264.55 264 266.8 269 272   BMI 32.48 kg/m2 33.45 kg/m2 33.97 kg/m2 35.8 kg/m2 36.18 kg/m2 36.48 kg/m2 36.89 kg/m2   BSA (m2) 2.45 m2 2.48 m2 2.5 m2 2.47 m2 2.48 m2 2.49 m2 2.5 m2   Visit Report    Report Report Report Report     Physical Exam.  Gen. AAO x3, NAD.  HEENT: No pallor or icterus, PERRLA, EOMI.   Skin: No rashes.  Heart: S1, S2/ RRR.   Lungs: CTA B.  Abdomen: Soft, NT/ND  MSK: Bilateral CMC squaring with positive grinding and tenderness.  No erythema or warmth of the CMC joints.  Handgrip fair.    Neuro:  Sensation to touch intact.Strength 5/5 throughout.   Psych:Appropriate mood and behavior  EXT: No edema      Assessment/Plan   67-year-old male with history of OA, bilateral CTS, exercise-induced asthma, HTN, dyslipidemia and obesity presented for follow-up.     #1: Bilateral CMC osteoarthritis.  -Kenalog 20 mg mixed with 0.5 mL of 1% lidocaine injected to each CMC joint.  -Patient was asked to take Tylenol as needed.    Follow-up as needed.     This note was partially generated using " the Dragon Voice recognition system. There may be some incorrect wording, spelling and/or spelling errors or punctuation errors that were not corrected prior to committing the note to the medical record.      Active Ambulatory Problems     Diagnosis Date Noted    Abdominal pain 01/24/2023    Acute bronchitis 01/24/2023    Cataract, nuclear sclerotic, both eyes 01/24/2023    Chest pain 01/24/2023    Depression, recurrent (CMS/HCC)     ED (erectile dysfunction) 01/24/2023    Fatigue 01/24/2023    Flashing lights seen 01/24/2023    Hemorrhoid 01/24/2023    Hyperlipidemia 01/24/2023    Hypertension 01/24/2023    Localized primary osteoarthritis of carpometacarpal joint of right thumb 01/24/2023    Mixed type age-related cataract, both eyes 01/24/2023    Neck pain 01/24/2023    Numerous moles 01/24/2023    Osteoarthritis 01/24/2023    Peyronie disease 01/24/2023    Posterior vitreous detachment 01/24/2023    PVD (posterior vitreous detachment), left eye 01/24/2023    Presbyopia 01/24/2023    Refractive error 01/24/2023    Right elbow pain 01/24/2023    Left shoulder pain 01/24/2023    Wrist pain 01/24/2023    Wears reading eyeglasses 01/24/2023    Urinary incontinence 01/24/2023    Vitamin deficiency 01/24/2023    Asthma 09/05/2023    Exercise-induced asthma 09/05/2023    GERD without esophagitis 09/05/2023    Hand arthritis 09/05/2023    Knee pain, bilateral 09/05/2023    Numbness and tingling in both hands 09/05/2023    Obesity (BMI 30.0-34.9) 09/05/2023    Urgency incontinence 09/05/2023    Benign prostatic hyperplasia with urinary frequency 09/05/2023    Urinary frequency 09/05/2023    Shoulder pain 09/05/2023    Suspected sleep apnea 11/02/2023    Disorder of rotator cuff of both shoulders 01/26/2016    Costochondral chest pain 01/26/2016    Bilateral carpal tunnel syndrome 01/26/2016     Resolved Ambulatory Problems     Diagnosis Date Noted    No Resolved Ambulatory Problems     Past Medical History:   Diagnosis  Date    Personal history of other (healed) physical injury and trauma        Family History   Problem Relation Name Age of Onset    Diabetes Other GRANDMOTHER        Past Surgical History:   Procedure Laterality Date    OTHER SURGICAL HISTORY  01/11/2019    No history of surgery     Social History     Tobacco Use   Smoking Status Never   Smokeless Tobacco Never     Allergies  Patient has no known allergies.  Current Meds  Current Outpatient Medications   Medication Instructions    Advair Diskus 100-50 mcg/dose diskus inhaler INHALE 1 PUFF BY MOUTH EVERY 12 HOURS    albuterol 90 mcg/actuation inhaler 1 puff, Every 4 hours PRN    losartan (Cozaar) 50 mg tablet TAKE 1 TABLET BY MOUTH EVERY DAY    metFORMIN XR (Glucophage-XR) 500 mg 24 hr tablet Take one po qd for one week, two qd for one week, then 3 po qd    montelukast (Singulair) 10 mg tablet TAKE 1 TABLET BY MOUTH EVERY DAY AS DIRECTED    omeprazole OTC (PRILOSEC OTC) 20 mg, oral, Do not crush, chew, or split.     rosuvastatin (Crestor) 10 mg tablet TAKE 1 TABLET BY MOUTH EVERYDAY AT BEDTIME    tadalafil (CIALIS) 5 mg, oral, Daily    tamsulosin (Flomax) 0.4 mg 24 hr capsule TAKE 1 CAPSULE BY MOUTH EVERYDAY AT BEDTIME    trospium (SANCTURA XR) 60 mg, oral, Daily      Lab Results   Component Value Date    RF <10 02/15/2023    SEDRATE 17 02/15/2023    CRP 0.50 02/15/2023    URICACID 4.9 02/15/2023        Small Joint Injection/Arthrocentesis: bilateral thumb CMC on 12/5/2023 1:38 PM  Indications: pain  Details: 25 G needle, medial approach  Medications (Right): 40 mg triamcinolone acetonide 40 mg/mL; 1 mL lidocaine 10 mg/mL (1 %)  Outcome: tolerated well, no immediate complications  Procedure, treatment alternatives, risks and benefits explained, specific risks discussed. Consent was given by the patient. Immediately prior to procedure a time out was called to verify the correct patient, procedure, equipment, support staff and site/side marked as required. Patient was  prepped and draped in the usual sterile fashion.        Tk Urbano MD

## 2023-12-06 ENCOUNTER — PROCEDURE VISIT (OUTPATIENT)
Dept: SLEEP MEDICINE | Facility: CLINIC | Age: 68
End: 2023-12-06
Payer: COMMERCIAL

## 2023-12-06 DIAGNOSIS — R29.818 SUSPECTED SLEEP APNEA: ICD-10-CM

## 2023-12-06 NOTE — PROGRESS NOTES
HST returned, there was no information to download. HSAT will need to be repeated.  Type of Study: HOME SLEEP STUDY - NOMAD     The patient received equipment and instructions for use of the Thermo[employer]eramixon OptuLink Nomad HSAT device. The patient was instructed how to apply the effort belts, cannula, thermistor. It was also explained how the Nomad and oximeter components work.  The patient was asked to record their sleep for an 8-hour period.     The patient was informed of their responsibility for the device and acknowledged this by signing the HSAT device contract. The patient was asked to return the device on 12/7/2023 between the hours of 8:00am - 4:00pm to the Sleep [facility].     The patient was instructed to call 911 as usual for any medical- emergencies while at home.  The patient was also given a phone number for troubleshooting when using the device in case there were additional questions.

## 2023-12-21 ENCOUNTER — APPOINTMENT (OUTPATIENT)
Dept: PRIMARY CARE | Facility: CLINIC | Age: 68
End: 2023-12-21
Payer: COMMERCIAL

## 2024-01-21 DIAGNOSIS — J45.909 UNSPECIFIED ASTHMA, UNCOMPLICATED (HHS-HCC): ICD-10-CM

## 2024-01-21 RX ORDER — CEPHALEXIN 250 MG/1
1 CAPSULE ORAL EVERY 12 HOURS
Qty: 60 EACH | Refills: 3 | Status: SHIPPED | OUTPATIENT
Start: 2024-01-21 | End: 2024-05-20

## 2024-01-23 DIAGNOSIS — N52.9 ERECTILE DYSFUNCTION, UNSPECIFIED ERECTILE DYSFUNCTION TYPE: ICD-10-CM

## 2024-01-23 DIAGNOSIS — N39.41 URGENCY INCONTINENCE: ICD-10-CM

## 2024-01-24 RX ORDER — TADALAFIL 5 MG/1
5 TABLET ORAL DAILY
Qty: 30 TABLET | Refills: 11 | Status: SHIPPED | OUTPATIENT
Start: 2024-01-24

## 2024-01-27 DIAGNOSIS — E11.9 TYPE 2 DIABETES MELLITUS WITHOUT COMPLICATION, WITHOUT LONG-TERM CURRENT USE OF INSULIN (MULTI): ICD-10-CM

## 2024-01-29 RX ORDER — METFORMIN HYDROCHLORIDE 500 MG/1
TABLET, EXTENDED RELEASE ORAL
Qty: 270 TABLET | Refills: 0 | Status: SHIPPED | OUTPATIENT
Start: 2024-01-29 | End: 2024-04-22 | Stop reason: SDUPTHER

## 2024-02-06 ENCOUNTER — OFFICE VISIT (OUTPATIENT)
Dept: SLEEP MEDICINE | Facility: CLINIC | Age: 69
End: 2024-02-06
Payer: COMMERCIAL

## 2024-02-06 VITALS
WEIGHT: 263.3 LBS | HEIGHT: 72 IN | RESPIRATION RATE: 16 BRPM | TEMPERATURE: 97.6 F | BODY MASS INDEX: 35.66 KG/M2 | DIASTOLIC BLOOD PRESSURE: 88 MMHG | OXYGEN SATURATION: 96 % | HEART RATE: 68 BPM | SYSTOLIC BLOOD PRESSURE: 145 MMHG

## 2024-02-06 DIAGNOSIS — J30.2 SEASONAL ALLERGIES: Primary | ICD-10-CM

## 2024-02-06 DIAGNOSIS — G47.30 SLEEP-DISORDERED BREATHING: ICD-10-CM

## 2024-02-06 DIAGNOSIS — G47.52 DREAM ENACTMENT BEHAVIOR: ICD-10-CM

## 2024-02-06 PROCEDURE — 99204 OFFICE O/P NEW MOD 45 MIN: CPT | Performed by: INTERNAL MEDICINE

## 2024-02-06 PROCEDURE — 1160F RVW MEDS BY RX/DR IN RCRD: CPT | Performed by: INTERNAL MEDICINE

## 2024-02-06 PROCEDURE — 3079F DIAST BP 80-89 MM HG: CPT | Performed by: INTERNAL MEDICINE

## 2024-02-06 PROCEDURE — 1036F TOBACCO NON-USER: CPT | Performed by: INTERNAL MEDICINE

## 2024-02-06 PROCEDURE — 1159F MED LIST DOCD IN RCRD: CPT | Performed by: INTERNAL MEDICINE

## 2024-02-06 PROCEDURE — 99214 OFFICE O/P EST MOD 30 MIN: CPT | Performed by: INTERNAL MEDICINE

## 2024-02-06 PROCEDURE — 1126F AMNT PAIN NOTED NONE PRSNT: CPT | Performed by: INTERNAL MEDICINE

## 2024-02-06 PROCEDURE — 3077F SYST BP >= 140 MM HG: CPT | Performed by: INTERNAL MEDICINE

## 2024-02-06 RX ORDER — AZELASTINE 1 MG/ML
1 SPRAY, METERED NASAL 2 TIMES DAILY
Qty: 30 ML | Refills: 12 | Status: SHIPPED | OUTPATIENT
Start: 2024-02-06 | End: 2025-02-05

## 2024-02-06 ASSESSMENT — SLEEP AND FATIGUE QUESTIONNAIRES
WAKING_TOO_EARLY: SEVERE
HOW LIKELY ARE YOU TO NOD OFF OR FALL ASLEEP WHILE SITTING AND READING: MODERATE CHANCE OF DOZING
HOW LIKELY ARE YOU TO NOD OFF OR FALL ASLEEP IN A CAR, WHILE STOPPED FOR A FEW MINUTES IN TRAFFIC: WOULD NEVER DOZE
HOW LIKELY ARE YOU TO NOD OFF OR FALL ASLEEP WHILE SITTING AND TALKING TO SOMEONE: WOULD NEVER DOZE
SLEEP_PROBLEM_INTERFERES_DAILY_ACTIVITIES: SOMEWHAT
HOW LIKELY ARE YOU TO NOD OFF OR FALL ASLEEP WHILE LYING DOWN TO REST IN THE AFTERNOON WHEN CIRCUMSTANCES PERMIT: HIGH CHANCE OF DOZING
SATISFACTION_WITH_CURRENT_SLEEP_PATTERN: DISSATISFIED
SITING INACTIVE IN A PUBLIC PLACE LIKE A CLASS ROOM OR A MOVIE THEATER: WOULD NEVER DOZE
HOW LIKELY ARE YOU TO NOD OFF OR FALL ASLEEP WHEN YOU ARE A PASSENGER IN A CAR FOR AN HOUR WITHOUT A BREAK: SLIGHT CHANCE OF DOZING
ESS-CHAD TOTAL SCORE: 11
WORRIED_DISTRESSED_DUE_TO_SLEEP: MUCH
SLEEP_PROBLEM_NOTICEABLE_TO_OTHERS: VERY MUCH NOTICEABLE
HOW LIKELY ARE YOU TO NOD OFF OR FALL ASLEEP WHILE WATCHING TV: HIGH CHANCE OF DOZING
HOW LIKELY ARE YOU TO NOD OFF OR FALL ASLEEP WHILE SITTING QUIETLY AFTER LUNCH WITHOUT ALCOHOL: MODERATE CHANCE OF DOZING
DIFFICULTY_STAYING_ASLEEP: SEVERE

## 2024-02-06 NOTE — PROGRESS NOTES
"[facility]  NEW CONSULT VISIT NOTE    PCP: Brian Alves MD  Referred by: No ref. provider found    HISTORY OF PRESENT ILLNESS     Patient ID: Hipolito Asencio \"Leslye" is a 68 y.o. male who presents to [facility] for a comprehensive sleep medicine evaluation with concerns of suspected sleep apnea.    Patient is here alone today.  To review, patient's medical history is notable for obesity (BMI 35), HTN, seasonal allergies, exercise-induced asthma, GERD, BPH, and suspected sleep apnea.    Patient had HSAT done in [facility] but  there was no info to download.    SLEEP-WAKE SCHEDULE  Bedtime:  12MN daily  Subjective sleep latency: 5 minutes  Number of awakenings:  2 times per night spontaneously for unknown reasons  Nocturia: Yes  Falls back asleep in 5-10 minutes  Final wake time:  7 AM daily  Out of bed time:  7 AM daily  Shift work: No  Naps: once daily for 20 minutes  Feels rested after a nap: No  Average sleep duration (excluding naps): 6 hours     SLEEP ENVIRONMENT  Sleep location: bed  Sleep status: sleeps alone while spouse sleeps in a different room  Preferred sleep position: side  TV in bedroom: Yes  Room is dark: Yes  Room is quiet: Yes  Room is cool: Yes    SLEEP HABITS  Smoking: no  ETOH: 4-5 drinks per week  Marijuana: no  Caffeine: 2 cups coffee daily in the morning  Sleep aids: no    SLEEP ROS  Night symptoms: POSITIVE for snoring, witnessed apnea, wake up gasping and/or choking for air, nasal congestion , mouth breathing, night sweats during sleep, waking up with racing heart, nocturnal cough, and nocturia and NEGATIVE for heartburn or sour taste in mouth at night  Morning symptoms: POSITIVE for unrefreshing sleep, morning headache, morning dry mouth, and morning sore throat  Daytime symptoms POSITIVE for excessive daytime sleepiness, fatigue, and irritability in daytime and NEGATIVE for trouble remembering things in daytime, trouble staying " focused in daytime, and drowsy driving  Hypersomnia / narcolepsy symptoms: Patient denies symptoms of a hypersomnolence disorder such as sleep paralysis, sleep-related hallucinations, recurrent sleep attacks, automatic behaviors, and cataplexy.   Parasomnia symptoms: POSITIVE for sleep talking and acting out dreams  Movements in sleep: POSITIVE for frequent leg kicks / jerks at night while asleep    RLS screen: Patient denies RLS symptoms.    WEIGHT: stable    Claustrophobia: No    REVIEW OF SYSTEMS     All other systems have been reviewed and are negative.    ALLERGIES     No Known Allergies    MEDICATIONS     Current Outpatient Medications   Medication Sig Dispense Refill    Advair Diskus 100-50 mcg/dose diskus inhaler TAKE 1 PUFF BY MOUTH EVERY 12 HOURS 60 each 3    albuterol 90 mcg/actuation inhaler INHALE 1 PUFF BY MOUTH EVERY 4 HOURS AS NEEDED 8 g 3    losartan (Cozaar) 50 mg tablet TAKE 1 TABLET BY MOUTH EVERY DAY 90 tablet 1    metFORMIN  mg 24 hr tablet 3 TABS PER DAY EVERY  tablet 0    montelukast (Singulair) 10 mg tablet TAKE 1 TABLET BY MOUTH EVERY DAY AS DIRECTED 90 tablet 1    omeprazole OTC (PriLOSEC OTC) 20 mg EC tablet Take 1 tablet (20 mg) by mouth. Do not crush, chew, or split.      rosuvastatin (Crestor) 10 mg tablet TAKE 1 TABLET BY MOUTH EVERYDAY AT BEDTIME 90 tablet 1    tadalafil (Cialis) 5 mg tablet Take 1 tablet (5 mg) by mouth once daily. 30 tablet 11    tamsulosin (Flomax) 0.4 mg 24 hr capsule TAKE 1 CAPSULE BY MOUTH EVERYDAY AT BEDTIME 90 capsule 1    trospium (Sanctura XR) 60 mg 24 hour capsule Take 1 capsule (60 mg) by mouth once daily.       No current facility-administered medications for this visit.       PAST HISTORIES     PERTINENT PAST MEDICAL HISTORY: See HPI    PERTINENT PAST SURGICAL HISTORY for Sleep Medicine:  non-contributory    PERTINENT FAMILY HISTORY for Sleep Medicine:  Patient denies family history of sleep apnea.    PERTINENT SOCIAL HISTORY:  He  reports  "that he has never smoked. He has never used smokeless tobacco. He reports current alcohol use of about 3.0 standard drinks of alcohol per week. He reports that he does not use drugs. He currently lives with spouse and retired from work. 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 Also, worked for [employer]    Active Problems, Allergy List, Medication List, and PMH/PSH/FH/Social Hx have been reviewed and reconciled in chart. No significant changes unless documented in the pertinent chart section. Updates made when necessary.     PHYSICAL EXAM     VITAL SIGNS: /88   Pulse 68   Temp 36.4 °C (97.6 °F)   Resp 16 Comment: RA  Ht 1.829 m (6')   Wt 119 kg (263 lb 4.8 oz)   SpO2 96%   BMI 35.71 kg/m²     NECK CIRCUMFERENCE: 17.5 inches    ESS: 11  RODERICK: 18    Physical Exam  Constitutional: Awake, not in distress  Head: normocephalic, atraumatic  Craniofacial: no retrognathia  Sinus: no tenderness to palpation  Nose: deviated nasal septum, hard to breathe on right nostril alone  Dental: wears dentures on maxilla and implants on mandible  Oropharynx: +white exudates, Gonzalez tongue position 3, Mallampati 3, lateral wall narrowing grade 4  Tonsils: +1  Uvula: midline on phonation  Tongue: Midline on protrusion, no Tongue scalloping, + macroglossia  Lungs: Clear to auscultation bilateral, no rales  Heart: Regular rate and rhythm, no murmurs  Skin: Warm, no rash  Neuro: No tremors, moves all extremities  Psych: alert and oriented to time, place, and person    RESULTS/DATA     No results found for: \"IRON\", \"TRANSFERRIN\", \"IRONSAT\", \"TIBC\", \"FERRITIN\"    Bicarbonate   Date Value Ref Range Status   11/27/2023 27 21 - 32 mmol/L Final       ASSESSMENT/PLAN     Hipolito Asencio \"Mike\" is a 68 y.o. male who is seen today in [facility] for the following problems:.     SUSPECTED SLEEP APNEA  - Due to history of dream enactment behavior and inconclusive HSAT data, recommend split night PSG " (split if AHI>15) with upper arm EMG leads (RBD protocol) to evaluate for SERGIO.  - Once split criteria has been met, start CPAP at 5 cm H2O, increase by 2 cm H2O increments, and switch to BPAP if persistent events noted at CPAP 15 cm H2O or if patient had CPAP intolerance. May add O2 or backup rate to BPAP if needed per protocol.  - Refer to after-visit-summary (AVS) for more details on how to prepare for the sleep study.   - Discussed sleep apnea in detail to include pathophysiology, risk factors, diagnostic options, treatment options, and cardiovascular / neurologic consequences of untreated SERGIO   - Supportive management: Lose weight. Stay off your back when sleeping. Avoid smoking, alcohol, and sedating medications if applicable. Don't drive when sleepy.    DREAM ENACTMENT BEHAVIOR  - Screaming in dream or flailing arms with last episode 3-4 months ago  - Patient denies history of injury or wife from acting out dreams  - Patient denies falling from bed    SEASONAL ALLERGIES  - Fluticasone nasal spray gives him headaches  - Takes Montelukast and Azelastine nasal spray    OBESITY  - BMI 35.71 today  - Recommend weight loss with diet and exercise.  - Weight loss can help in long term management of SERGIO.  - Defer management to PCP  - Will do preop risk evaluation once patient comes back with good PAP compliance    HYPERTENSION  - BP slightly high today. Untreated  or inadequately treated sleep apnea can lead to new onset hypertension, resistant hypertension, or refractory hypertension.  - Continue anti-hypertensive medications per PCP.  - Supportive management: low salt DASH diet (less than 2000 mg sodium intake daily), moderate intensity aerobic exercise at least 30 minutes 5 days per week, reduce stress, quit smoking, limit alcohol, lose weight, and monitor BP once daily  - PCP following. Defer management to PCP      All of patient's questions were answered. He verbalizes understanding and agreement with my  assessment and plan. Refer to after-visit-summary (AVS) for patient education and more details on troubleshooting issues with PAP usage, proper cleaning instructions of PAP supplies, and usual recommended replacement schedule for each of the PAP supplies.     Follow-up in 2-3 weeks after sleep study.

## 2024-02-06 NOTE — PATIENT INSTRUCTIONS
"Thank you for coming to the Sleep Medicine Clinic today! Your sleep medicine provider today was: Lakisha Escalante MD Below is a summary of your treatment plan, patient education, other important information, and our contact numbers.      TREATMENT PLAN     - Get the following sleep study scheduled: Split night sleep study  - Please read the \"Patient Education\" section below for more detailed information. Try implementing tips, reminders, strategies, and supportive management.   - If not yet done, please sign up for [employer] to make a future schedule, send prescription requests, or send messages.    Follow-up Appointment:   Follow-up in 2-3 weeks after sleep study.    PATIENT EDUCATION     Obstructive Sleep Apnea (SERGIO) is a sleep disorder where your upper airway muscles relax during sleep and the airway intermittently and repetitively narrows and collapses leading to partially blocked airway (hypopnea) or completely blocked airway (apnea) which, in turn, can disrupt breathing in sleep, lower oxygen levels while you sleep and cause night time wakings. Because both apnea and hypopnea may cause higher carbon dioxide or low oxygen levels, untreated SERGIO can lead to heart arrhythmia, elevation of blood pressure, and make it harder for the body to consolidate memory and facilitate metabolism (leading to higher blood sugars at night). Frequent partial arousals occur during sleep resulting in sleep deprivation and daytime sleepiness. SERGIO is associated with an increased risk of cardiovascular disease, stroke, hypertension, and insulin resistance. Moreover, untreated SERGIO with excessive daytime sleepiness can increase the risk of motor vehicular accidents.    Some conservative strategies for SERGIO regardless of SERGIO severity are:   Positional therapy - Avoid sleeping on your back.   Healthy diet and regular exercise to optimize weight is highly encouraged.   Avoid alcohol late in the evening and sedative-hypnotics as these substances " can make sleep apnea worse.   Improve breathing through the nose with intranasal steroid spray, saline rinse, or antihistamines    Safety: Avoid driving vehicle and operating heavy equipment while sleepy. Drowsy driving may lead to life-threatening motor vehicle accidents. A person driving while sleepy is 5 times more likely to have an accident. If you feel sleepy, pull over and take a short power nap (sleep for less than 30 minutes). Otherwise, ask somebody to drive you.    Treatment options for sleep apnea include weight management, positional therapy, Positive Airway Therapy (PAP) therapy, oral appliance therapy, hypoglossal nerve stimulator (Inspire) and select airway surgeries.    Sleep Testing for sleep apnea: The best and ideal way to check out if you have sleep apnea is to do an overnight sleep study in the sleep laboratory. Alternatively, a home sleep apnea test can also be done depending on your insurance and risk factors.     If you are having a home sleep apnea test, kindly allot 1 hour during pickup of the testing kit as you will have to complete paperwork and listen to the sleep technician for in-person on-the-spot demonstration and instructions on how to hook up the testing kit at home. Do the test for 1 day and start off with sleeping on your back. If you sleep on your side in the middle of night or you have always been a side or stomach-sleeper, it is ok as long as you have some time on your back and off-back.     If you are having an overnight in-lab sleep study, please make sure to bring toiletries, a comfy pillow, additional warm blankets, and any nighttime medications (include as-needed inhaler, pain pill, etc) that you may regularly take. Also, be sure to eat dinner before you arrive as we generally do not provide meals inside the sleep testing center. Lastly, in order to fall asleep faster in the sleep testing center, we advise patients to wake up 2 hours earlier on the morning of scheduled  testing and avoid napping 2 days prior testing. Sometimes, your sleep provider may prescribe a sleep aid to be taken at lights out in the sleep testing center. If you are taking a sleep aid, consider having somebody pick you up after the sleep testing.    Overnight sleep studies may be scheduled on a weekday or weekend. We also perform daytime testing for shift workers on a case-by-case basis.    Once you have booked an appointment to do the sleep study, please contact my office for follow-up visit to discuss results.    On the other hand, if you have any of the following, please consider calling the sleep testing center to RESCHEDULE your sleep study appointment:  If you tested positive for COVID within 10 days of your sleep study appointment.  If you were exposed to somebody who was confirmed for COVID within 10 days of your sleep study appointment and now you are having symptoms of possible COVID  If you have fever>100F or any acute symptoms that you think will lead to poor sleep during testing (e.g. new or worsening stuffy nose not relieved by steroid nasal spray)  If you have traveled domestically or internationally in the last month and now you are having symptoms of possible COVID    You can also go to the following EDUCATION WEBSITES for further information:   American Academy of Sleep Medicine http://sleepeducation.org  National Sleep Foundation: https://sleepfoundation.org  American Sleep Apnea Association: https://www.sleepapnea.org (for patients with sleep apnea)  Narcolepsy Network: https://www.narcolepsynetwork.org (for patients with narcolepsy)  [employer]: https://www.wakeupnarcolepsy.org (for patients with narcolepsy)  Hypersomnia Foundation: https://www.hypersomniafoundation.org (for patients with idiopathic hypersomnia)  RLS foundation: https://www.rls.org (for patients with restless leg syndrome)    IMPORTANT INFORMATION     Call 911 for medical emergencies.  Our offices are generally  open from Monday-Friday, 8 am - 5 pm.   There are no supporting services by either the sleep doctors or their staff on weekends and Holidays, or after 5 PM on weekdays.   If you need to get in touch with me, you may either call my office number or you can use [employer].  If a referral for a test, for CPAP, or for another specialist was made, and you have not heard about scheduling this within a week, please call scheduling at [phone]).  If you are unable to make your appointment for clinic or an overnight study, kindly call the office or sleep testing center at least 48 hours in advance to cancel and reschedule.  If you are on CPAP, please bring your device's card and/or the device to each clinic appointment.   In case of problems with PAP machine or mask interface, please contact your [employer] (Durable Medical Equipment) company first. [employer] is the company who provides you the machine and/or PAP supplies.       PRESCRIPTIONS     We require 7 days advanced notice for prescription refills. If we do not receive the request in this time, we cannot guarantee that your medication will be refilled in time.    IMPORTANT PHONE NUMBERS     Sleep Medicine Clinic Fax: [phone]  Appointments (for Pediatric Sleep Clinic): [phone] - option 1  Appointments (for Adult Sleep Clinic): [phone] - option 2  Appointments (For Sleep Studies): [phone] - option 3  Behavioral Sleep Medicine: [phone]  Sleep Surgery: [phone]  Nutrition Service: [phone]  Weight management clinics with endocrinology: [phone]  Bariatric Services: [phone] (includes weight loss medications and weight loss surgery)  [facility]: [phone] (offers holistic approaches to weight management)  ENT (Otolaryngology): [phone]  Headache Clinic (Neurology): [phone]  Neurology: [phone]  Psychiatry: [phone]  Pulmonary Function Testing (PFT) Center: [phone]  Pulmonary  "Medicine: [phone]  Medical Service [employer] ([employer]): [phone]      OUR SLEEP TESTING LOCATIONS     Our team will contact you to schedule your sleep study, however, you can contact us as follow:  Main Phone Line (scheduling only): [phone]), option 3  Adult and Pediatric Locations  [facility] (6 years and older): [facility] - 4th floor ([address] [facility]) After hours line: [phone]  [facility] (Main campus: All ages): [facility], 6th floor. After hours line: [phone]   [place] (5 years and older; younger considered on case-by-case basis): [address]; [address], [address]. Scheduling  After hours line: [phone]   [place] (6 years and older): [address]; [address]; [address]   [place] (6 years and older): [address] [facility], [address]  After hours line: [phone]   [place] (13 years and older) in [place]: [address], 2nd floor  After hours line: [phone]   [place] (13 year and older): [address]  After hours line: [phone]     Adult Only Locations:   [place] (18 years and older): [address] [facility], 2nd floor   [place] (18 years and older): [address] [facility]; 4th floor  After hours line: [phone]  [facility] (18 years and older) at [place]: [address]  After hours line: [phone]     CONTACTING YOUR SLEEP MEDICINE PROVIDER AND SLEEP TEAM      For issues with your machine or mask interface, please call your DME provider first. [employer] stands for durable medical company. [employer] is the company who provides you the machine and/or PAP supplies / accessories.   To schedule, cancel, or reschedule SLEEP STUDY APPOINTMENTS, please call the Main Phone Line at [phone] - option 3.   To schedule, cancel, or reschedule CLINIC APPOINTMENTS, you can do it in \"MyChart\", call [phone] to speak with my " " (Kandi Barone), or call the Main Phone Line at [phone] - option 2  For CLINICAL QUESTIONS or MEDICATION REFILLS, please call direct line for Adult Sleep Nurses at [phone].   Lastly, you can also send a message directly to your provider through \"My Chart\", which is the email service through your  Records Account: [email or web address]       Here at [facility], we wish you a restful sleep!    "

## 2024-03-05 ENCOUNTER — CLINICAL SUPPORT (OUTPATIENT)
Dept: SLEEP MEDICINE | Facility: CLINIC | Age: 69
End: 2024-03-05
Payer: COMMERCIAL

## 2024-03-05 DIAGNOSIS — G47.33 OBSTRUCTIVE SLEEP APNEA (ADULT) (PEDIATRIC): ICD-10-CM

## 2024-03-05 DIAGNOSIS — G47.61 PERIODIC LIMB MOVEMENT DISORDER: ICD-10-CM

## 2024-03-05 DIAGNOSIS — G47.52 DREAM ENACTMENT BEHAVIOR: ICD-10-CM

## 2024-03-05 DIAGNOSIS — G47.30 SLEEP-DISORDERED BREATHING: ICD-10-CM

## 2024-03-05 PROCEDURE — 95810 POLYSOM 6/> YRS 4/> PARAM: CPT | Performed by: INTERNAL MEDICINE

## 2024-03-06 ENCOUNTER — APPOINTMENT (OUTPATIENT)
Dept: SLEEP MEDICINE | Facility: CLINIC | Age: 69
End: 2024-03-06
Payer: COMMERCIAL

## 2024-03-06 VITALS
BODY MASS INDEX: 35.21 KG/M2 | DIASTOLIC BLOOD PRESSURE: 88 MMHG | SYSTOLIC BLOOD PRESSURE: 145 MMHG | WEIGHT: 259.99 LBS | HEIGHT: 72 IN

## 2024-03-06 ASSESSMENT — SLEEP AND FATIGUE QUESTIONNAIRES
HOW LIKELY ARE YOU TO NOD OFF OR FALL ASLEEP WHEN YOU ARE A PASSENGER IN A CAR FOR AN HOUR WITHOUT A BREAK: SLIGHT CHANCE OF DOZING
SITING INACTIVE IN A PUBLIC PLACE LIKE A CLASS ROOM OR A MOVIE THEATER: SLIGHT CHANCE OF DOZING
ESS-CHAD TOTAL SCORE: 14
HOW LIKELY ARE YOU TO NOD OFF OR FALL ASLEEP WHILE SITTING AND TALKING TO SOMEONE: WOULD NEVER DOZE
HOW LIKELY ARE YOU TO NOD OFF OR FALL ASLEEP WHILE SITTING QUIETLY AFTER LUNCH WITHOUT ALCOHOL: HIGH CHANCE OF DOZING
HOW LIKELY ARE YOU TO NOD OFF OR FALL ASLEEP WHILE LYING DOWN TO REST IN THE AFTERNOON WHEN CIRCUMSTANCES PERMIT: HIGH CHANCE OF DOZING
HOW LIKELY ARE YOU TO NOD OFF OR FALL ASLEEP IN A CAR, WHILE STOPPED FOR A FEW MINUTES IN TRAFFIC: WOULD NEVER DOZE
HOW LIKELY ARE YOU TO NOD OFF OR FALL ASLEEP WHILE WATCHING TV: HIGH CHANCE OF DOZING
HOW LIKELY ARE YOU TO NOD OFF OR FALL ASLEEP WHILE SITTING AND READING: HIGH CHANCE OF DOZING

## 2024-03-06 NOTE — PROGRESS NOTES
[facility] TECH NOTE:     Patient: Hipolito Asencio   MRN//AGE: [id number]  1955  68 y.o.   Technologist: Ingrid Jurado   Room: 3   Service Date: 3/6/2024        Sleep Testing Location: [facility]:     TECHNOLOGIST SLEEP STUDY PROCEDURE NOTE:   This sleep study is being conducted according to the policies and procedures outlined by the AAS accreditation standards.  The sleep study procedure and processes involved during this appointment was explained to the patient/patient’s family, questions were answered. The patient/family verbalized understanding.      The patient is a 68 y.o. year old male scheduled for a Split Night  with montage of:  standard . he arrived for his appointment.      The study that was ultimately completed was a diagnostic PSG  with montage of:  standard .    The full study Was completed.  Patient questionnaires completed?: yes     Consents signed? yes    Initial Fall Risk Screening:     Hipolito has not fallen in the last 6 months. his did not result in injury. Hipolito does not have a fear of falling. He does not need assistance with sitting, standing, or walking. he does not need assistance walking in his home. he does not need assistance in an unfamiliar setting. The patient is notusing an assistive device.     Brief Study observations:  The patient presents as a Split night study with RBD protocol, add arm leads, specific PAP instructions. He met split protocol, CPAP 5 cm. was started using a Resmed P10 medium mask. He became extremely anxious and told the tech that he could not breathe, not enough air. Tech tried to increase PAP pressure to 7 cm. and still he requested the mask come off. He told the tech that he is very congested and is not able to breathe well through his nose. So an Antolin FF large mask was applied with heated humidity started using CPAP pressure 5-7 cm. He started to panic and gag holding his chest requesting the mask off again. Tech brought him a ginger ale to calm  and help with the nausea. We tried BPAP lastly 8/4 heated humidity with the Antolin ff large and he became panicky again, gagging needing the mask off quickly. The study was completed as a diagnostic study. Please note he did request the head of the bed be elevated after the CPAP situation and slept elevated by a 25 degree angle for the remained of the study. The head of the bed elevation did not appear to affect the amount of SERGIO he was having as he was still demonstrating severe SERGIO.     Deviation to order/protocol and reason: Unable to tolerate CPAP/BPAP, met split criteria, finished as a diagnostic PSG.       If PAP, which was preferred mask/pressure/mode: See above      Other:None    After the procedure, the patient/family was informed to ensure followup with ordering clinician for testing results.      Technologist: Ingrid Jurado

## 2024-03-19 ENCOUNTER — LAB (OUTPATIENT)
Dept: LAB | Facility: LAB | Age: 69
End: 2024-03-19
Payer: COMMERCIAL

## 2024-03-19 DIAGNOSIS — E11.9 TYPE 2 DIABETES MELLITUS WITHOUT COMPLICATION, WITHOUT LONG-TERM CURRENT USE OF INSULIN (MULTI): ICD-10-CM

## 2024-03-19 LAB
ANION GAP SERPL CALC-SCNC: 12 MMOL/L (ref 10–20)
BUN SERPL-MCNC: 23 MG/DL (ref 6–23)
CALCIUM SERPL-MCNC: 9.3 MG/DL (ref 8.6–10.6)
CHLORIDE SERPL-SCNC: 102 MMOL/L (ref 98–107)
CO2 SERPL-SCNC: 29 MMOL/L (ref 21–32)
CREAT SERPL-MCNC: 1.13 MG/DL (ref 0.5–1.3)
EGFRCR SERPLBLD CKD-EPI 2021: 72 ML/MIN/1.73M*2
EST. AVERAGE GLUCOSE BLD GHB EST-MCNC: 134 MG/DL
GLUCOSE SERPL-MCNC: 129 MG/DL (ref 74–99)
HBA1C MFR BLD: 6.3 %
POTASSIUM SERPL-SCNC: 4.4 MMOL/L (ref 3.5–5.3)
SODIUM SERPL-SCNC: 139 MMOL/L (ref 136–145)

## 2024-03-19 PROCEDURE — 83036 HEMOGLOBIN GLYCOSYLATED A1C: CPT

## 2024-03-19 PROCEDURE — 36415 COLL VENOUS BLD VENIPUNCTURE: CPT

## 2024-03-19 PROCEDURE — 80048 BASIC METABOLIC PNL TOTAL CA: CPT

## 2024-03-21 ENCOUNTER — OFFICE VISIT (OUTPATIENT)
Dept: PRIMARY CARE | Facility: CLINIC | Age: 69
End: 2024-03-21
Payer: COMMERCIAL

## 2024-03-21 VITALS
HEIGHT: 71 IN | TEMPERATURE: 98.5 F | SYSTOLIC BLOOD PRESSURE: 151 MMHG | HEART RATE: 87 BPM | BODY MASS INDEX: 36.68 KG/M2 | WEIGHT: 262 LBS | OXYGEN SATURATION: 93 % | DIASTOLIC BLOOD PRESSURE: 78 MMHG

## 2024-03-21 DIAGNOSIS — G47.33 OSA (OBSTRUCTIVE SLEEP APNEA): Primary | ICD-10-CM

## 2024-03-21 DIAGNOSIS — E11.9 TYPE 2 DIABETES MELLITUS WITHOUT COMPLICATION, WITHOUT LONG-TERM CURRENT USE OF INSULIN (MULTI): ICD-10-CM

## 2024-03-21 DIAGNOSIS — I10 ESSENTIAL (PRIMARY) HYPERTENSION: ICD-10-CM

## 2024-03-21 DIAGNOSIS — J34.2 NASAL SEPTAL DEVIATION: ICD-10-CM

## 2024-03-21 PROCEDURE — 1160F RVW MEDS BY RX/DR IN RCRD: CPT | Performed by: INTERNAL MEDICINE

## 2024-03-21 PROCEDURE — 3078F DIAST BP <80 MM HG: CPT | Performed by: INTERNAL MEDICINE

## 2024-03-21 PROCEDURE — 99214 OFFICE O/P EST MOD 30 MIN: CPT | Performed by: INTERNAL MEDICINE

## 2024-03-21 PROCEDURE — 1036F TOBACCO NON-USER: CPT | Performed by: INTERNAL MEDICINE

## 2024-03-21 PROCEDURE — 3077F SYST BP >= 140 MM HG: CPT | Performed by: INTERNAL MEDICINE

## 2024-03-21 PROCEDURE — 3044F HG A1C LEVEL LT 7.0%: CPT | Performed by: INTERNAL MEDICINE

## 2024-03-21 PROCEDURE — 4010F ACE/ARB THERAPY RXD/TAKEN: CPT | Performed by: INTERNAL MEDICINE

## 2024-03-21 PROCEDURE — 1159F MED LIST DOCD IN RCRD: CPT | Performed by: INTERNAL MEDICINE

## 2024-03-21 RX ORDER — LOSARTAN POTASSIUM 100 MG/1
100 TABLET ORAL DAILY
Qty: 100 TABLET | Refills: 3 | Status: SHIPPED | OUTPATIENT
Start: 2024-03-21 | End: 2025-04-25

## 2024-03-21 NOTE — PROGRESS NOTES
"PCP: Brian Alves MD   I have reviewed existing histories, notes, past medical history, surgical history, social history, family history, med list, allergy list, and immunization, and updated.    HPI:   Follow up htn, dm, weight.  Saw Dr. Escalante, home sleep test first, then needed in lab sleep test. Was dx of abeba, and during the test he could not tolerate the cpap mask for split night study. He will Follow up with Dr. Escalante    He has started watching diet, and more exercises. Has lost about 10 lbs.  Feels better  hba1c came down  Htn bp still high.  On 50mg losartan.  Has asthma, breathing is no problem on meds    Hx of nasal septal deviation with surgery in the 1990's. Experiences some nasal blockage at times. 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 Refer to ENT for all these issues.    Lab Results   Component Value Date    WBC 7.5 11/27/2023    HGB 14.0 11/27/2023    HCT 42.3 11/27/2023     11/27/2023    CHOL 178 11/27/2023    TRIG 320 (H) 11/27/2023    HDL 39.2 11/27/2023    ALT 28 11/27/2023    AST 19 11/27/2023     03/19/2024    K 4.4 03/19/2024     03/19/2024    CREATININE 1.13 03/19/2024    BUN 23 03/19/2024    CO2 29 03/19/2024    TSH 1.05 09/30/2021    PSA 2.48 02/19/2020    HGBA1C 6.3 (H) 03/19/2024     Physical exam:  /78   Pulse 87   Temp 36.9 °C (98.5 °F)   Ht 1.803 m (5' 11\")   Wt 119 kg (262 lb)   SpO2 93%   BMI 36.54 kg/m²    Neck exam showed no mass, lymphadenopathy, or thyroid enlargement, and no carotid bruit.  Heart exam showed normal heart sounds, no murmur, clicks, gallops or rubs. Regular rate and rhythm. Chest is clear; no wheezes or rales.  Central obesity  No leg edema    Assessments/orders:   1. ABEBA (obstructive sleep apnea)  Referral to ENT      2. Essential (primary) hypertension  losartan (Cozaar) 100 mg tablet      3. Nasal septal deviation  Referral to ENT      4. Type 2 diabetes mellitus without complication, without " long-term current use of insulin (CMS/HCC)        5. Depression, recurrent (CMS/HCC)

## 2024-03-26 DIAGNOSIS — M19.91 PRIMARY OSTEOARTHRITIS, UNSPECIFIED SITE: Primary | ICD-10-CM

## 2024-03-26 RX ORDER — MELOXICAM 15 MG/1
15 TABLET ORAL DAILY
Qty: 15 TABLET | Refills: 0 | Status: SHIPPED | OUTPATIENT
Start: 2024-03-26 | End: 2024-05-24 | Stop reason: SDUPTHER

## 2024-04-04 ENCOUNTER — APPOINTMENT (OUTPATIENT)
Dept: PRIMARY CARE | Facility: CLINIC | Age: 69
End: 2024-04-04
Payer: COMMERCIAL

## 2024-04-08 DIAGNOSIS — M19.91 PRIMARY OSTEOARTHRITIS, UNSPECIFIED SITE: ICD-10-CM

## 2024-04-09 RX ORDER — MELOXICAM 15 MG/1
15 TABLET ORAL DAILY
Qty: 15 TABLET | Refills: 0 | OUTPATIENT
Start: 2024-04-09

## 2024-04-11 DIAGNOSIS — M19.91 PRIMARY OSTEOARTHRITIS, UNSPECIFIED SITE: ICD-10-CM

## 2024-04-11 RX ORDER — MELOXICAM 15 MG/1
15 TABLET ORAL DAILY
Qty: 15 TABLET | Refills: 0 | OUTPATIENT
Start: 2024-04-11

## 2024-04-16 ENCOUNTER — TELEPHONE (OUTPATIENT)
Dept: PRIMARY CARE | Facility: CLINIC | Age: 69
End: 2024-04-16

## 2024-04-16 ENCOUNTER — OFFICE VISIT (OUTPATIENT)
Dept: SLEEP MEDICINE | Facility: CLINIC | Age: 69
End: 2024-04-16
Payer: COMMERCIAL

## 2024-04-16 VITALS
HEIGHT: 72 IN | TEMPERATURE: 97.9 F | WEIGHT: 261.4 LBS | DIASTOLIC BLOOD PRESSURE: 77 MMHG | RESPIRATION RATE: 16 BRPM | SYSTOLIC BLOOD PRESSURE: 147 MMHG | BODY MASS INDEX: 35.41 KG/M2

## 2024-04-16 DIAGNOSIS — I10 BENIGN ESSENTIAL HYPERTENSION: ICD-10-CM

## 2024-04-16 DIAGNOSIS — G47.52 DREAM ENACTMENT BEHAVIOR: ICD-10-CM

## 2024-04-16 DIAGNOSIS — E66.9 OBESITY (BMI 30-39.9): ICD-10-CM

## 2024-04-16 DIAGNOSIS — G47.33 OBSTRUCTIVE SLEEP APNEA: Primary | ICD-10-CM

## 2024-04-16 PROCEDURE — 1159F MED LIST DOCD IN RCRD: CPT | Performed by: INTERNAL MEDICINE

## 2024-04-16 PROCEDURE — 3078F DIAST BP <80 MM HG: CPT | Performed by: INTERNAL MEDICINE

## 2024-04-16 PROCEDURE — G2211 COMPLEX E/M VISIT ADD ON: HCPCS | Performed by: INTERNAL MEDICINE

## 2024-04-16 PROCEDURE — 1160F RVW MEDS BY RX/DR IN RCRD: CPT | Performed by: INTERNAL MEDICINE

## 2024-04-16 PROCEDURE — 99214 OFFICE O/P EST MOD 30 MIN: CPT | Performed by: INTERNAL MEDICINE

## 2024-04-16 PROCEDURE — 3077F SYST BP >= 140 MM HG: CPT | Performed by: INTERNAL MEDICINE

## 2024-04-16 PROCEDURE — 1036F TOBACCO NON-USER: CPT | Performed by: INTERNAL MEDICINE

## 2024-04-16 PROCEDURE — 3044F HG A1C LEVEL LT 7.0%: CPT | Performed by: INTERNAL MEDICINE

## 2024-04-16 PROCEDURE — 4010F ACE/ARB THERAPY RXD/TAKEN: CPT | Performed by: INTERNAL MEDICINE

## 2024-04-16 ASSESSMENT — SLEEP AND FATIGUE QUESTIONNAIRES
HOW LIKELY ARE YOU TO NOD OFF OR FALL ASLEEP WHILE LYING DOWN TO REST IN THE AFTERNOON WHEN CIRCUMSTANCES PERMIT: HIGH CHANCE OF DOZING
HOW LIKELY ARE YOU TO NOD OFF OR FALL ASLEEP WHILE SITTING AND READING: MODERATE CHANCE OF DOZING
HOW LIKELY ARE YOU TO NOD OFF OR FALL ASLEEP WHILE SITTING AND TALKING TO SOMEONE: WOULD NEVER DOZE
SITING INACTIVE IN A PUBLIC PLACE LIKE A CLASS ROOM OR A MOVIE THEATER: WOULD NEVER DOZE
HOW LIKELY ARE YOU TO NOD OFF OR FALL ASLEEP WHILE SITTING QUIETLY AFTER LUNCH WITHOUT ALCOHOL: HIGH CHANCE OF DOZING
HOW LIKELY ARE YOU TO NOD OFF OR FALL ASLEEP WHEN YOU ARE A PASSENGER IN A CAR FOR AN HOUR WITHOUT A BREAK: SLIGHT CHANCE OF DOZING
HOW LIKELY ARE YOU TO NOD OFF OR FALL ASLEEP WHILE WATCHING TV: MODERATE CHANCE OF DOZING
ESS-CHAD TOTAL SCORE: 11
HOW LIKELY ARE YOU TO NOD OFF OR FALL ASLEEP IN A CAR, WHILE STOPPED FOR A FEW MINUTES IN TRAFFIC: WOULD NEVER DOZE

## 2024-04-16 NOTE — TELEPHONE ENCOUNTER
Please ask pt to be seen this week for check. He has asthma, needs to be seen to see what med would help him.

## 2024-04-16 NOTE — TELEPHONE ENCOUNTER
Patient called in and he has been the zpak and he is not getting better has been to urgent care 2 times the cough meds are not doing anything went to his sleep md today and she suggested calling you for a stronger RX for the bronchist can you call in something for him?

## 2024-04-16 NOTE — PROGRESS NOTES
"[facility] SLEEP MEDICINE CLINIC  FOLLOW-UP VISIT NOTE    PCP: Brian Alves MD    HISTORY OF PRESENT ILLNESS     Patient ID: Hipolito Asencio \"Mike\" is a 68 y.o. male who presents for follow-up after sleep study    Patient is here alone today.  To review, patient's medical history is notable for  obesity (BMI 35), HTN, seasonal allergies, exercise-induced asthma, GERD, BPH, and SERGIO.     Interval History  Patient was last seen in 2/6/2024. Plan was to do split night PSG for SERGIO evaluation.  Since last visit, patient had completed sleep study which showed severe SERGIO. Patient met split criteria but was not able to tolerate any mask as he felt suffocated when trying to breathe thru the nose. He states that he has always been a mouth breather.    RLS Follow-up:   Denies    SLEEP STUDY HISTORY (personally reviewed raw data such as interpretation report, data sheet, hypnogram, and titration table if available and applicable)  PSG  3/5/2024: RDI3% 69.8 (supine RDI3% 104.8, non-supine RDI3% 64.3), RDI4% 62.5 (supine RDI4% 99.7, non-supine RDI4% 56.7), SpO2 ioana 73%, spent 43.1 mins with SpO2<89%, PLM index 23. Pt met split criteria but did not tolerate masks and machines; hence, study was continued as diagnostic PSG. Resmed Airfit P10 nasal pillows and F&P Antolin FFM were tried. Both CPAP and BPAP were also tried.     SLEEP-WAKE SCHEDULE  Bedtime:  12 MN daily  Subjective sleep latency: 5 minutes  Number of awakenings:  2 times per night spontaneously for unknown reasons  Nocturia: Yes  Falls back asleep in 5-10 minutes  Final wake time:  7 AM daily  Out of bed time:  7 AM daily  Shift work: No  Naps: once daily for 20 minutes  Feels rested after a nap: No  Average sleep duration (excluding naps): 6 hours    SLEEP ENVIRONMENT  Sleep location: bed  Sleep status: sleeps alone while spouse sleeps in a different room  Preferred sleep position: side    SOCIAL HISTORY  Smoking: quit 7 years ago. He used to smoke 1 pack " "for 3 days for 30 years  ETOH: 4-5 drinks per week  Marijuana: no  Caffeine: 2 cups coffee daily in the morning  Sleep aids: no    WEIGHT: stable    Claustrophobia: No     Active Problems, Allergy List, Medication List, and PMH/PSH/FH/Social Hx have been reviewed and reconciled in chart. No significant changes unless documented in the pertinent chart section. Updates made when necessary.     PHYSICAL EXAM     VITAL SIGNS: /77   Temp 36.6 °C (97.9 °F)   Resp 16   Ht 1.829 m (6')   Wt 119 kg (261 lb 6.4 oz)   BMI 35.45 kg/m²     NECK CIRCUMFERENCE: 17.5 inches    Today ESS: 11  Last visit ESS: 11  RODERICK: 18    Physical Exam  Constitutional: Awake, not in distress  Head: Normocephalic, atraumatic  Skin: Warm, no rash  Neuro: No tremors, moves all extremities  Psych: alert and oriented to time, place, and person    RESULTS/DATA     No results found for: \"IRON\", \"TRANSFERRIN\", \"IRONSAT\", \"TIBC\", \"FERRITIN\"    Bicarbonate   Date Value Ref Range Status   03/19/2024 29 21 - 32 mmol/L Final       ASSESSMENT/PLAN     Hipolito Asencio \"Mike\" is a 68 y.o. male who returns in [facility] for the following problems:    OBSTRUCTIVE SLEEP APNEA, SEVERE positional (PSG RDI3% 69.8, RDI4% 62.5 )  SLEEP-RELATED HYPOXEMIA  - Personally reviewed the sleep study's raw data such as interpretation report, data sheet, and hypnogram. A copy of recent testing was either given to patient or released in [employer]. See HPI for detailed summary of sleep study results.  - Discussed the sleep study results and treatment options with the patient.   - Recommend starting auto-CPAP 5-15 cm H2O with EPR 3, heated humidification, heated tubings, and mask fitting session. Orders sent to [facility].  - Discussed 30-day mask guarantee and insurance requirement regarding PAP compliance and follow-up.   - Advised about cleaning instructions and replacement schedule of PAP accessories.  - Supportive management as " follows: Lose weight. Stay off your back when sleeping. Avoid smoking, alcohol, and sedating medications if applicable. Don't drive when sleepy.    DREAM ENACTMENT BEHAVIOR  - Screaming in dream or flailing arms with last episode 3-4 months ago  - Patient denies history of injury or wife from acting out dreams  - Patient denies falling from bed     SEASONAL ALLERGIES  - Fluticasone nasal spray gives him headaches  - Takes Montelukast and Azelastine nasal spray     OBESITY  - BMI 35.71 today  - Recommend weight loss with diet and exercise.  - Weight loss can help in long term management of SERGIO.  - Defer management to PCP    HYPERTENSION  - BP slightly high today.   - Untreated  or inadequately treated sleep apnea can lead to new onset hypertension, resistant hypertension, or refractory hypertension.  - Continue anti-hypertensive medications per PCP.  - Supportive management: low salt DASH diet (less than 2000 mg sodium intake daily), moderate intensity aerobic exercise at least 30 minutes 5 days per week, reduce stress, quit smoking, limit alcohol, lose weight, and monitor BP once daily  - PCP following. Defer management to PCP        All of patient's questions were answered. He verbalizes understanding and agreement with my assessment and plan. Refer to after-visit-summary (AVS) for patient education and if applicable, for more details on troubleshooting issues with PAP usage, proper cleaning instructions of PAP supplies, and usual recommended replacement schedule for each of the PAP supplies.     Follow-up in 31-90 days after getting new machine.

## 2024-04-16 NOTE — PATIENT INSTRUCTIONS
"Thank you for coming to the Sleep Medicine Clinic today! Your sleep medicine provider today was: Lakisha Escalante MD Below is a summary of your treatment plan, patient education, other important information, and our contact numbers.      TREATMENT PLAN     - Start auto-CPAP. Order placed and sent to [facility].  - Please read the \"Patient Education\" section below for more detailed information. Try implementing tips, reminders, strategies, and supportive management.   - If not yet done, please sign up for [employer] to make a future schedule, send prescription requests, or send messages.    Follow-up Appointment:   Follow-up in 31-90 days after getting new machine.    PATIENT EDUCATION     Obstructive Sleep Apnea (SERGIO) is a sleep disorder where your upper airway muscles relax during sleep and the airway intermittently and repetitively narrows and collapses leading to partially blocked airway (hypopnea) or completely blocked airway (apnea) which, in turn, can disrupt breathing in sleep, lower oxygen levels while you sleep and cause night time wakings. Because both apnea and hypopnea may cause higher carbon dioxide or low oxygen levels, untreated SERGIO can lead to heart arrhythmia, elevation of blood pressure, and make it harder for the body to consolidate memory and facilitate metabolism (leading to higher blood sugars at night). Frequent partial arousals occur during sleep resulting in sleep deprivation and daytime sleepiness. SERGIO is associated with an increased risk of cardiovascular disease, stroke, hypertension, and insulin resistance. Moreover, untreated SERGIO with excessive daytime sleepiness can increase the risk of motor vehicular accidents.    Some conservative strategies for SERGIO regardless of SERGIO severity are:   Positional therapy - Avoid sleeping on your back.   Healthy diet and regular exercise to optimize weight is highly encouraged.   Avoid alcohol late in the evening and sedative-hypnotics as these " substances can make sleep apnea worse.   Improve breathing through the nose with intranasal steroid spray, saline rinse, or antihistamines    Safety: Avoid driving vehicle and operating heavy equipment while sleepy. Drowsy driving may lead to life-threatening motor vehicle accidents. A person driving while sleepy is 5 times more likely to have an accident. If you feel sleepy, pull over and take a short power nap (sleep for less than 30 minutes). Otherwise, ask somebody to drive you.    Treatment options for sleep apnea include weight management, positional therapy, Positive Airway Therapy (PAP) therapy, oral appliance therapy, hypoglossal nerve stimulator (Inspire) and select airway surgeries.    Starting Positive Airway Pressure (PAP): You were ordered a device to wear when you sleep called PAP (Positive Airway Pressure) to treat your sleep apnea. The order will be submitted to a durable medical equipment (DME) company who will arrange setting you up with the device. They will provide all the necessary equipment and discuss use and maintenance of the device with you as well as mask fitting and process of replacing / renewing PAP supplies or accessories. Once you get the machine, please start using it immediately. You may not be successful right away and that is okay. [place] be certain that you keep trying nightly and reach out to DME if you are struggling or having issues with machine usage.     *Please follow-up with me in 1-2 months of starting CPAP to see how well it is working for you and to do some troubleshooting if needed. Also, please bring all PAP equipment with you to follow up appointments unless told otherwise.     Important things to keep in mind as you start PAP:  Insurance will monitor your usage during the first 90 days. You should use your PAP - all night, every night, and including all naps (especially if naps are more than 30 minutes) for your health. The bare minimum is to use your PAP device  while sleeping for at least 4 hours per day at least 5-6 days per week.. Otherwise, your PAP device will be reclaimed by your DME company at 90 days.  There are many masks to choose from to wear with your PAP machine. If you are not comfortable with the first mask issued to you, call your DME company and ask for another option to try. You typically have a 30-day mask guarantee from the day you received your machine.   Discuss with your provider if you are having issues breathing with the machine or if the temperature or humidity feel uncomfortable.  Expect to have an adjustment period when you start your device. It helps to continuing wearing the machine every day for a period of time until you get more used to it. You can practice with wearing the mask alone if you need, then add in the PAP air pressure a few days later.   Reach out for help if you are struggling! The sleep medicine department can be reached at [phone]  We encourage you to download data monitoring apps to your phone. For [employer] 10/11 - MyAir rudy. For [employer] - DreamMapper. Both apps are available in the Rudy store for free and are a great tool to monitor your progress with your PAP device night to night.    Tips for success with PAP machine usage:  Comfortable and well-fitting mask  Appropriate pressure on the machine  Using humidification  Support from bed partner and clinical team      Maintaining your CPAP/BPAP device:    The humidification chamber (aka water tank or water chamber) needs to be filled with distilled water to prevent buildup of white deposits in the future. If you cannot find distilled water, you can use tap water but expect to have white deposits buildup seen after prolonged use with tap water. If you start seeing white deposits on the water chamber, you can clean it by filling it with equal parts of distilled white vinegar and water. Let the vinegar-water mixture sit for 2 hours, and then rinse  it with running tap water. Clean with soap and water then let it dry.     You should try to keep your machine clean in order to work well. Here are some tips to clean PAP supplies / accessories:    Clean the humidification chamber (aka water tank) as well as your mask and tubing at least once a week with soap and water.   Alternatively, you can fill a sink or basin with warm water and add a little mild detergent, like Ivory dish soap. Gently wipe your supplies with the soapy water to free all the oils and dirt that may have collected. Once that's done, rinse these items with clean water until the soap is gone and let them air dry. You can hang your tubing over the curtain nova in your bathroom so that it dries.  The mask insert (part of the mask that has contact with your skin) needs to be cleaned with soap and water daily. Another option is to wipe them down with CPAP wipes or baby wipes.    You should replace your mask and tubing frequently in order to prevent bacteria buildup, machine damage, and mask seal issues. The older the mask and hose, the high likelihood that there is bacteria buildup in it especially if they are not cleaned regularly. Dirty filters damage machines because build-up of dust and contaminants can cause machine to over-heat, and in time, damage the motor of machine. Cushions lose their seal over time as most masks are made of plastic and silicone while headgear is made of neoprene. These materials will break down with age and frequent use. Here is the recommended replacement schedule for PAP supplies / accessories:    Twice a month- disposable filters and cushions for nasal mask or nasal pillows.  Once a month- cushion for full face mask  Every 3 months- mask with headgear and PAP tubing (standard or heated hose)  Every 6 months- reusable filter, water chamber, and chin strap     Other useful information:    Some people do not put water in the tank while other people prefers to put water in  the tank to prevent mouth dryness. Try to experiment to determine which is more comfortable for you.   In general, new machines have 2 years warranty on parts while health insurance allows you to have a new machine once every 5 years.     Common issues with PAP machine:    Mask gets dislodged when turning to the side: Consider getting a CPAP pillow or switching to a mask with hose on top.     Dry mouth:  Your machine has built-in humidifier that heats up the air to prevent dry mouth. It can be adjusted to your comfort. You can try that first and increase setting one level one night at a time to check which setting is comfortable and effective in lessening dry mouth. In some patients with heated hose, adjusting tube temperature to make air warmer can improve dry mouth. If dry mouth persists despite adjusting humidity or tube temperature setting, may apply OTC Biotene gel over the gums at bedtime.  If Biotene gel is not effective, consider trying XEROSTOM gel from [employer].  Also, eliminate or reduce dose of medications that can cause dry mouth if possible. Lastly, may try getting a separate room humidifier machine.    Airleaks: Please call DME as they may need to adjust your mask or refit you with a different kind or different size of mask. In addition, you can ask DME for tips on getting a good mask seal and mask fit.     Difficulty tolerating the mask: Contact your DME to try a different kind of mask and/or call office to get a referral to Sleep Psychologist for CPAP desensitization. CPAP desensitization technique is a set of strategies that helps patient cope with claustrophobia and anxiety related to wearing mask. Alternatively, we can do a daytime mini-sleep study called PAP-nap trial wherein you will try on different kinds of mask and the sleep technician will try different pressure settings on CPAP and BPAP machines to see which specific pressure is tolerable and comfortable for you.     Water droplets or  "moisture within the hose and/or mask: This is called rain-out and it is caused by condensation of too much heated humidity on the cooler walls of the hose. If you have rain-out, turn down humidity settings or get a heated hose. If you already have a heated hose, turn up the \"tube temperature\" of the heated hose. Alternatively, if you don't want to get a heated hose or warmer air, may wrap the CPAP hose with stockings to keep it somewhat warm. Also, you need to place the machine on the floor and lower the hose so that water won't travel upward towards your mask.     PAP desensitization techniques: If you have concerns about something being on your face at night, you can start by getting used to it before trying to sleep with it as follows:      Sit in a comfortable chair or bed. Connect the mask and hose to the CPAP/BiPAP machine. Hold the mask on your face (without straps on) and turn on the machine. Practice breathing with the mask on while awake sitting and watching television, reading, or performing a sedentary activity during the day for 5-10 minutes and then take it off.  If tolerated, try again and gradually build up to longer periods of time. If not tolerated, try and try again until it is more comfortable as you become more desensitized. If you are able to use it for at least 20-30 minutes, move unto the next step.     Sit in a comfortable chair or bed. Connect the mask and hose to the CPAP/BiPAP machine. Strap the mask on your head and turn on the machine. Practice breathing with the mask and headgear on while awake sitting and watching television, reading, or performing a sedentary activity. Start with 5-10 minutes and gradually increasing time until you can wear it comfortably for at least 20-30 minutes, then move to the next step.    Take a shorter daytime nap with machine turned on while you are in a reclined position in bed, sofa, or recliner. Start with 5-10 minute nap and gradually increase up to 30 " minutes. It is not important whether you fall asleep or not. The goal is to rest comfortably with PAP machine on.     Reintroduce PAP machine into nighttime sleep. You can begin using it a portion of the night and gradually increase up to entire night.     Proceed from one step to the next only when you are completely comfortable. If you feel any anxiety or discomfort, return to the previous step, then proceed again when comfortable.    Expect to “work” with your CPAP/BIPAP unit. It is important to try to relax when beginning CPAP/BIPAP therapy. Inhalation and exhalation should occur through the nose only. If you are unable to consistently breathe this way, do not panic or lose hope. There are other types of masks which allow you to breathe through your nose and/or your mouth. Also, in some patients, using intranasal steroid spray (e.g. Flonase or Nasocort or Fluticasone) 1 hour before bedtime and/or before putting on CPAP mask can help tolerate breathing through the mask.    Don't give up after a few attempts--some patients adjust quickly, while some patients need 3-4 weeks (or sometimes even longer) to be accustomed to CPAP therapy.  Contact your sleep medicine specialist if you have a significant change in weight since this may affect your pressure.    You can also go to the following EDUCATION WEBSITES for further information:   American Academy of Sleep Medicine http://sleepeducation.org  National Sleep Foundation: https://sleepfoundation.org  American Sleep Apnea Association: https://www.sleepapnea.org (for patients with sleep apnea)  Narcolepsy Network: https://www.narcolepsynetwork.org (for patients with narcolepsy)  [employer]: https://www.wakeupnarcolepsy.org (for patients with narcolepsy)  Hypersomnia Foundation: https://www.hypersomniafoundation.org (for patients with idiopathic hypersomnia)  RLS foundation: https://www.rls.org (for patients with restless leg syndrome)    IMPORTANT INFORMATION      Call 911 for medical emergencies.  Our offices are generally open from Monday-Friday, 8 am - 5 pm.   There are no supporting services by either the sleep doctors or their staff on weekends and Holidays, or after 5 PM on weekdays.   If you need to get in touch with me, you may either call my office number or you can use SureGene.  If a referral for a test, for CPAP, or for another specialist was made, and you have not heard about scheduling this within a week, please call scheduling at [phone]).  If you are unable to make your appointment for clinic or an overnight study, kindly call the office or sleep testing center at least 48 hours in advance to cancel and reschedule.  If you are on CPAP, please bring your device's card and/or the device to each clinic appointment.   In case of problems with PAP machine or mask interface, please contact your DME (Durable Medical Equipment) company first. DME is the company who provides you the machine and/or PAP supplies.       PRESCRIPTIONS     We require 7 days advanced notice for prescription refills. If we do not receive the request in this time, we cannot guarantee that your medication will be refilled in time.    IMPORTANT PHONE NUMBERS     Sleep Medicine Clinic Fax: [phone]  Appointments (for Pediatric Sleep Clinic): [phone] - option 1  Appointments (for Adult Sleep Clinic): [phone] - option 2  Appointments (For Sleep Studies): [phone] - option 3  Behavioral Sleep Medicine: [phone]  Sleep Surgery: [phone]  Nutrition Service: [phone]  Weight management clinics with endocrinology: [phone]  Bariatric Services: [phone] (includes weight loss medications and weight loss surgery)  [facility]: [phone] (offers holistic approaches to weight management)  ENT (Otolaryngology): [phone]  Headache Clinic (Neurology): [phone]  Neurology: [phone]  Psychiatry:  [phone]  Pulmonary Function Testing (PFT) Center: [phone]  Pulmonary Medicine: [phone]  Medical Service [facility] ([place]): [phone]      OUR SLEEP TESTING LOCATIONS     Our team will contact you to schedule your sleep study, however, you can contact us as follow:  Main Phone Line (scheduling only): [phone]), option 3  Adult and Pediatric Locations  [facility] (6 years and older): [facility] [address] - 4th floor ([address]) After hours line: [phone]  [facility] (Main campus: All ages): [facility], 6th floor. After hours line: [phone]   [place] (5 years and older; younger considered on case-by-case basis): [address]; [address], [address]. Scheduling  After hours line: [phone]   [place] (6 years and older): [address]; [address]; [address]   [place] (6 years and older): [address], [address]  After hours line: [phone]   [place] (13 years and older) in [place]: [address], 2nd floor  After hours line: [phone]   [place] (13 year and older): [address]  After hours line: [phone]     Adult Only Locations:   [place] (18 years and older): [address] [facility], 2nd floor   [place] (18 years and older): [address] [facility]; 4th floor  After hours line: [phone]  [facility] (18 years and older) at [place]: [address]  After hours line: [phone]     CONTACTING YOUR SLEEP MEDICINE PROVIDER AND SLEEP TEAM      For issues with your machine or mask interface, please call your DME provider first. DME stands for durable medical company. DME is the company who provides you the machine and/or PAP supplies / accessories.   To schedule, cancel, or reschedule SLEEP STUDY APPOINTMENTS, please call the Main Phone Line at [phone] - option 3.   To schedule, cancel, or reschedule CLINIC  "APPOINTMENTS, you can do it in \"MyChart\", call [phone] to speak with my  (Kandi Barone), or call the Main Phone Line at [phone] - option 2  For CLINICAL QUESTIONS or MEDICATION REFILLS, please call direct line for Adult Sleep Nurses at [phone].   Lastly, you can also send a message directly to your provider through \"My Chart\", which is the email service through your  Records Account: [email or web address]       Here at [facility], we wish you a restful sleep!    "

## 2024-04-17 ENCOUNTER — OFFICE VISIT (OUTPATIENT)
Dept: PRIMARY CARE | Facility: CLINIC | Age: 69
End: 2024-04-17
Payer: COMMERCIAL

## 2024-04-17 VITALS
SYSTOLIC BLOOD PRESSURE: 137 MMHG | BODY MASS INDEX: 35.49 KG/M2 | WEIGHT: 262 LBS | RESPIRATION RATE: 16 BRPM | DIASTOLIC BLOOD PRESSURE: 82 MMHG | OXYGEN SATURATION: 96 % | HEART RATE: 83 BPM | HEIGHT: 72 IN | TEMPERATURE: 96.9 F

## 2024-04-17 DIAGNOSIS — J45.901 EXACERBATION OF ASTHMA, UNSPECIFIED ASTHMA SEVERITY, UNSPECIFIED WHETHER PERSISTENT (HHS-HCC): Primary | ICD-10-CM

## 2024-04-17 PROCEDURE — 3044F HG A1C LEVEL LT 7.0%: CPT | Performed by: FAMILY MEDICINE

## 2024-04-17 PROCEDURE — 99214 OFFICE O/P EST MOD 30 MIN: CPT | Performed by: FAMILY MEDICINE

## 2024-04-17 PROCEDURE — 3075F SYST BP GE 130 - 139MM HG: CPT | Performed by: FAMILY MEDICINE

## 2024-04-17 PROCEDURE — 4010F ACE/ARB THERAPY RXD/TAKEN: CPT | Performed by: FAMILY MEDICINE

## 2024-04-17 PROCEDURE — 3079F DIAST BP 80-89 MM HG: CPT | Performed by: FAMILY MEDICINE

## 2024-04-17 PROCEDURE — 1160F RVW MEDS BY RX/DR IN RCRD: CPT | Performed by: FAMILY MEDICINE

## 2024-04-17 PROCEDURE — 1159F MED LIST DOCD IN RCRD: CPT | Performed by: FAMILY MEDICINE

## 2024-04-17 RX ORDER — DOXYCYCLINE 100 MG/1
100 CAPSULE ORAL 2 TIMES DAILY
Qty: 20 CAPSULE | Refills: 0 | Status: SHIPPED | OUTPATIENT
Start: 2024-04-17 | End: 2024-04-27

## 2024-04-17 NOTE — PROGRESS NOTES
Subjective   Patient ID: Mike Asencio is a 68 y.o. male who presents for Cough, COPD, and Asthma.    HPI   PCP: Dr. Alves    Reports, cough, wheeze > 2 wks.  Asso w/rhinorrhea, post nasal gtt, mild sinus pressure, minimal sore throat.  No nasal congestion, fever, chills, loss of smell or taste, nausea, vomiting, diarrhea, headaches, body aches.  Has been looking through old box of pictures, not sure if that may have caused Sx to occur.  Seen at [facility] clinic (records not available).  Tx w/Prednisone, Delsym.  Seen at urgent care (records not available).  Tx w/Zithromax, Tessalon.  Feels only a little better since urgent care visit, but Sx still present.  Has not had a covid test.     Has h/o asthma.    No PFTs in EMR.  States he has never had PFTs.    Taking Advair, Singulair as directed and Albuterol prn.  Documented social history in Epic indicates no past or present smoking history.  Documented social history in [employer] indicates 1/2 ppd ages 25-55 (15 pack/yrs).  No prior CT for lung cancer screening.  Patient reports max 1/2 ppd x may 27 or so yrs (not completely sure).    Review of Systems  No other complaints.     Objective   /82   Pulse 83   Temp 36.1 °C (96.9 °F)   Resp 16   Ht 1.829 m (6')   Wt 119 kg (262 lb)   SpO2 96%   BMI 35.53 kg/m²     Physical Exam  Constitutional:       General: He is not in acute distress.     Appearance: He is obese.   HENT:      Right Ear: Tympanic membrane normal.      Left Ear: Tympanic membrane normal.      Nose:      Right Sinus: No maxillary sinus tenderness or frontal sinus tenderness.      Left Sinus: No maxillary sinus tenderness or frontal sinus tenderness.      Mouth/Throat:      Pharynx: Oropharynx is clear. No oropharyngeal exudate or posterior oropharyngeal erythema.   Cardiovascular:      Pulses: Normal pulses.      Heart sounds: Normal heart sounds. No murmur heard.     No friction rub. No gallop.   Pulmonary:      Breath sounds: Normal breath  sounds. No wheezing, rhonchi or rales.   Neurological:      Mental Status: He is oriented to person, place, and time.   Psychiatric:         Mood and Affect: Mood normal.         Behavior: Behavior normal.     Assessment/Plan   Diagnoses and all orders for this visit:  Exacerbation of asthma, unspecified asthma severity, unspecified whether persistent ([facility])  -     doxycycline (Vibramycin) 100 mg capsule; Take 1 capsule (100 mg) by mouth 2 times a day for 10 days. Take with at least 8 ounces (large glass) of water, do not lie down for 30 minutes after    Recommend home covid test.  Doxycycline provided.  Continue maintenance inhaler as directed and rescue inhaler as needed.    F/U with PCP:       Will need CXR if symptoms persist.       Will need PFTs even if symptoms completely resolve.       Will need CT chest for lung cancer screening.

## 2024-04-17 NOTE — PATIENT INSTRUCTIONS
Recommend home covid test.  Doxycycline provided.  Continue maintenance inhaler as directed and rescue inhaler as needed.    F/U with PCP:       Will need CXR if symptoms persist.       Will need PFTs even if symptoms completely resolve.       Will need CT chest for lung cancer screening.

## 2024-04-22 ENCOUNTER — TELEPHONE (OUTPATIENT)
Dept: PRIMARY CARE | Facility: CLINIC | Age: 69
End: 2024-04-22
Payer: COMMERCIAL

## 2024-04-22 DIAGNOSIS — E11.9 TYPE 2 DIABETES MELLITUS WITHOUT COMPLICATION, WITHOUT LONG-TERM CURRENT USE OF INSULIN (MULTI): ICD-10-CM

## 2024-04-22 DIAGNOSIS — R05.2 SUBACUTE COUGH: Primary | ICD-10-CM

## 2024-04-22 RX ORDER — BENZONATATE 200 MG/1
CAPSULE ORAL
COMMUNITY
Start: 2024-04-05

## 2024-04-22 RX ORDER — PREDNISONE 20 MG/1
TABLET ORAL
COMMUNITY
Start: 2024-04-09

## 2024-04-22 RX ORDER — METHYLPREDNISOLONE 4 MG/1
TABLET ORAL
Qty: 21 TABLET | Refills: 0 | Status: SHIPPED | OUTPATIENT
Start: 2024-04-22 | End: 2024-04-29

## 2024-04-22 RX ORDER — METFORMIN HYDROCHLORIDE 500 MG/1
TABLET, EXTENDED RELEASE ORAL
Qty: 270 TABLET | Refills: 1 | Status: SHIPPED | OUTPATIENT
Start: 2024-04-22

## 2024-04-22 RX ORDER — BENZONATATE 200 MG/1
200 CAPSULE ORAL 3 TIMES DAILY PRN
Qty: 42 CAPSULE | Refills: 0 | Status: SHIPPED | OUTPATIENT
Start: 2024-04-22 | End: 2024-05-22

## 2024-04-22 NOTE — TELEPHONE ENCOUNTER
PT STATES THAT HE SAW DR. MACDONALD AND HE GAVE HIM AN ANTIBIOTIC DOXYCYCLINE HE WOULD LIKE TO KNOW IF YOU COULD GIVE HIM SOMETHING FOR HIS COUGH AND WHEEZING. HE IS CURRENTLY USING HIS INHALER

## 2024-04-30 ENCOUNTER — APPOINTMENT (OUTPATIENT)
Dept: RHEUMATOLOGY | Facility: CLINIC | Age: 69
End: 2024-04-30
Payer: COMMERCIAL

## 2024-05-13 DIAGNOSIS — M19.91 PRIMARY OSTEOARTHRITIS, UNSPECIFIED SITE: ICD-10-CM

## 2024-05-14 RX ORDER — MELOXICAM 15 MG/1
15 TABLET ORAL DAILY
Qty: 15 TABLET | Refills: 0 | OUTPATIENT
Start: 2024-05-14

## 2024-05-19 DIAGNOSIS — J45.909 UNSPECIFIED ASTHMA, UNCOMPLICATED (HHS-HCC): ICD-10-CM

## 2024-05-20 DIAGNOSIS — E78.5 HYPERLIPIDEMIA, UNSPECIFIED: ICD-10-CM

## 2024-05-20 RX ORDER — ROSUVASTATIN CALCIUM 10 MG/1
TABLET, COATED ORAL
Qty: 90 TABLET | Refills: 1 | Status: SHIPPED | OUTPATIENT
Start: 2024-05-20

## 2024-05-20 RX ORDER — FLUTICASONE PROPIONATE AND SALMETEROL 100; 50 UG/1; UG/1
1 POWDER RESPIRATORY (INHALATION) EVERY 12 HOURS
Qty: 60 EACH | Refills: 3 | Status: SHIPPED | OUTPATIENT
Start: 2024-05-20

## 2024-05-24 ENCOUNTER — OFFICE VISIT (OUTPATIENT)
Dept: RHEUMATOLOGY | Facility: CLINIC | Age: 69
End: 2024-05-24
Payer: COMMERCIAL

## 2024-05-24 VITALS
HEART RATE: 66 BPM | BODY MASS INDEX: 35.53 KG/M2 | WEIGHT: 262 LBS | DIASTOLIC BLOOD PRESSURE: 88 MMHG | RESPIRATION RATE: 18 BRPM | OXYGEN SATURATION: 94 % | SYSTOLIC BLOOD PRESSURE: 137 MMHG

## 2024-05-24 DIAGNOSIS — M19.041 PRIMARY OSTEOARTHRITIS OF BOTH HANDS: Primary | ICD-10-CM

## 2024-05-24 DIAGNOSIS — M19.91 PRIMARY OSTEOARTHRITIS, UNSPECIFIED SITE: ICD-10-CM

## 2024-05-24 DIAGNOSIS — M19.042 PRIMARY OSTEOARTHRITIS OF BOTH HANDS: Primary | ICD-10-CM

## 2024-05-24 PROCEDURE — 1159F MED LIST DOCD IN RCRD: CPT | Performed by: INTERNAL MEDICINE

## 2024-05-24 PROCEDURE — 1160F RVW MEDS BY RX/DR IN RCRD: CPT | Performed by: INTERNAL MEDICINE

## 2024-05-24 PROCEDURE — 3079F DIAST BP 80-89 MM HG: CPT | Performed by: INTERNAL MEDICINE

## 2024-05-24 PROCEDURE — 20600 DRAIN/INJ JOINT/BURSA W/O US: CPT | Performed by: INTERNAL MEDICINE

## 2024-05-24 PROCEDURE — 1125F AMNT PAIN NOTED PAIN PRSNT: CPT | Performed by: INTERNAL MEDICINE

## 2024-05-24 PROCEDURE — 99213 OFFICE O/P EST LOW 20 MIN: CPT | Performed by: INTERNAL MEDICINE

## 2024-05-24 PROCEDURE — 1036F TOBACCO NON-USER: CPT | Performed by: INTERNAL MEDICINE

## 2024-05-24 PROCEDURE — 4010F ACE/ARB THERAPY RXD/TAKEN: CPT | Performed by: INTERNAL MEDICINE

## 2024-05-24 PROCEDURE — 3044F HG A1C LEVEL LT 7.0%: CPT | Performed by: INTERNAL MEDICINE

## 2024-05-24 PROCEDURE — 3075F SYST BP GE 130 - 139MM HG: CPT | Performed by: INTERNAL MEDICINE

## 2024-05-24 RX ORDER — LIDOCAINE HYDROCHLORIDE 10 MG/ML
0.5 INJECTION INFILTRATION; PERINEURAL ONCE
Status: COMPLETED | OUTPATIENT
Start: 2024-05-24 | End: 2024-05-24

## 2024-05-24 RX ORDER — MELOXICAM 15 MG/1
15 TABLET ORAL DAILY
Qty: 90 TABLET | Refills: 1 | Status: SHIPPED | OUTPATIENT
Start: 2024-05-24 | End: 2024-08-22

## 2024-05-24 RX ORDER — TRIAMCINOLONE ACETONIDE 40 MG/ML
20 INJECTION, SUSPENSION INTRA-ARTICULAR; INTRAMUSCULAR ONCE
Status: COMPLETED | OUTPATIENT
Start: 2024-05-24 | End: 2024-05-24

## 2024-05-24 RX ORDER — TRIAMCINOLONE ACETONIDE 40 MG/ML
20 INJECTION, SUSPENSION INTRA-ARTICULAR; INTRAMUSCULAR
Status: COMPLETED | OUTPATIENT
Start: 2024-05-24 | End: 2024-05-24

## 2024-05-24 RX ORDER — LIDOCAINE HYDROCHLORIDE 10 MG/ML
0.5 INJECTION INFILTRATION; PERINEURAL
Status: COMPLETED | OUTPATIENT
Start: 2024-05-24 | End: 2024-05-24

## 2024-05-24 RX ADMIN — LIDOCAINE HYDROCHLORIDE 0.5 ML: 10 INJECTION INFILTRATION; PERINEURAL at 09:54

## 2024-05-24 RX ADMIN — LIDOCAINE HYDROCHLORIDE 5 MG: 10 INJECTION INFILTRATION; PERINEURAL at 09:57

## 2024-05-24 RX ADMIN — TRIAMCINOLONE ACETONIDE 20 MG: 40 INJECTION, SUSPENSION INTRA-ARTICULAR; INTRAMUSCULAR at 09:54

## 2024-05-24 RX ADMIN — TRIAMCINOLONE ACETONIDE 20 MG: 40 INJECTION, SUSPENSION INTRA-ARTICULAR; INTRAMUSCULAR at 09:57

## 2024-05-24 ASSESSMENT — ENCOUNTER SYMPTOMS: PAIN: 1

## 2024-05-24 ASSESSMENT — PAIN SCALES - GENERAL: PAINLEVEL: 10-WORST PAIN EVER

## 2024-05-24 ASSESSMENT — COLUMBIA-SUICIDE SEVERITY RATING SCALE - C-SSRS: 1. IN THE PAST MONTH, HAVE YOU WISHED YOU WERE DEAD OR WISHED YOU COULD GO TO SLEEP AND NOT WAKE UP?: NO

## 2024-05-24 NOTE — PROGRESS NOTES
"Subjective  . Hipolito Aesncio \"Mike\" is a 68 y.o. male who presents for Follow-up, Med Refill, and Pain.    Med Refill    Pain     .68-year-old male with history of OA, bilateral CTS, exercise-induced asthma, HTN, dyslipidemia and obesity presented for follow-up.     He reports intermittent pain in his thumbs and takes meloxicam feels better.  However he worked in the yard couple weeks ago and noted worsening pain in his right CMC joint.  He has not noticed redness or swelling of the joint.    Review of Systems   All other systems reviewed and are negative.  Objective     Blood pressure 137/88, pulse 66, resp. rate 18, weight 119 kg (262 lb), SpO2 94%.    Physical Exam.  Gen. AAO x3, NAD.  HEENT: No pallor or icterus, PERRLA, EOMI.   Skin: No rashes.  Heart: S1, S2/ RRR.   Lungs: CTA B.  Abdomen: Soft, NT/ND.  MSK: Bilateral CMC squaring with positive grinding and tenderness.  Right CMC with moderate tenderness.  No erythema or effusion of the right CMC joint.  Neuro: Sensation to touch intact.Strength 5/5 throughout.   Psych:Appropriate mood and behavior  EXT: No edema    Assessment/Plan . 68-year-old male with history of OA, bilateral CTS, exercise-induced asthma, HTN, dyslipidemia and obesity presented for follow-up.     #1: Bilateral CMC osteoarthritis.  -Kenalog 20 mg mixed with 0.5 mL of 1% lidocaine injected to the right CMC joint.  -Continue meloxicam as needed.  Patient will ask primary care physician for future refills.    Follow-up as needed.     This note was partially generated using the Dragon Voice recognition system. There may be some incorrect wording, spelling and/or spelling errors or punctuation errors that were not corrected prior to committing the note to the medical record.  Problem List Items Addressed This Visit       Osteoarthritis    Relevant Medications    meloxicam (Mobic) 15 mg tablet            Active Ambulatory Problems     Diagnosis Date Noted    Abdominal pain 01/24/2023    Acute " bronchitis 01/24/2023    Cataract, nuclear sclerotic, both eyes 01/24/2023    Chest pain 01/24/2023    Depression, recurrent (CMS-HCC)     ED (erectile dysfunction) 01/24/2023    Fatigue 01/24/2023    Flashing lights seen 01/24/2023    Hemorrhoid 01/24/2023    Hyperlipidemia 01/24/2023    Hypertension 01/24/2023    Localized primary osteoarthritis of carpometacarpal joint of right thumb 01/24/2023    Mixed type age-related cataract, both eyes 01/24/2023    Neck pain 01/24/2023    Numerous moles 01/24/2023    Osteoarthritis 01/24/2023    Peyronie disease 01/24/2023    Posterior vitreous detachment 01/24/2023    PVD (posterior vitreous detachment), left eye 01/24/2023    Presbyopia 01/24/2023    Refractive error 01/24/2023    Right elbow pain 01/24/2023    Left shoulder pain 01/24/2023    Wrist pain 01/24/2023    Wears reading eyeglasses 01/24/2023    Urinary incontinence 01/24/2023    Vitamin deficiency 01/24/2023    Asthma ([facility]) 09/05/2023    Exercise-induced asthma ([facility]) 09/05/2023    GERD without esophagitis 09/05/2023    Hand arthritis 09/05/2023    Knee pain, bilateral 09/05/2023    Numbness and tingling in both hands 09/05/2023    Obesity (BMI 30.0-34.9) 09/05/2023    Urgency incontinence 09/05/2023    Benign prostatic hyperplasia with urinary frequency 09/05/2023    Urinary frequency 09/05/2023    Shoulder pain 09/05/2023    Suspected sleep apnea 11/02/2023    Disorder of rotator cuff of both shoulders 01/26/2016    Costochondral chest pain 01/26/2016    Bilateral carpal tunnel syndrome 01/26/2016    Type 2 diabetes mellitus without complication, without long-term current use of insulin ([facility]) 03/21/2024     Resolved Ambulatory Problems     Diagnosis Date Noted    No Resolved Ambulatory Problems     Past Medical History:   Diagnosis Date    Personal history of other (healed) physical injury and trauma        Family History   Problem Relation Name Age of Onset    Diabetes Other GRANDMOTHER        Past  Surgical History:   Procedure Laterality Date    OTHER SURGICAL HISTORY  01/11/2019    No history of surgery       Social History     Tobacco Use   Smoking Status Never   Smokeless Tobacco Never       Allergies  Patient has no known allergies.    Current Meds  Current Outpatient Medications   Medication Instructions    albuterol 90 mcg/actuation inhaler 1 puff, Every 4 hours PRN    azelastine (Astelin) 137 mcg (0.1 %) nasal spray 1 spray, Each Nostril, 2 times daily, Shake gently then remove cap and point spray tip sideways toward your ears before applying. After use, clean tip.    benzonatate (Tessalon) 200 mg capsule 1 CAPSULE ORALLY EVERY 8 HOURS, FOR 10 DAYS.    fluticasone propion-salmeteroL (Advair Diskus) 100-50 mcg/dose diskus inhaler 1 puff, inhalation, Every 12 hours    losartan (COZAAR) 100 mg, oral, Daily    meloxicam (MOBIC) 15 mg, oral, Daily, Take with food.    metFORMIN  mg 24 hr tablet 3 TABS PER DAY EVERY DAY    montelukast (Singulair) 10 mg tablet TAKE 1 TABLET BY MOUTH EVERY DAY AS DIRECTED    omeprazole OTC (PRILOSEC OTC) 20 mg, oral, Do not crush, chew, or split.     predniSONE (Deltasone) 20 mg tablet TAKE 2 TABLETS ORALLY ONCE A DAY (MORNING), FOR 5 DAYS. WITH FOOD.    rosuvastatin (Crestor) 10 mg tablet TAKE 1 TABLET BY MOUTH EVERYDAY AT BEDTIME    tadalafil (CIALIS) 5 mg, oral, Daily    tamsulosin (Flomax) 0.4 mg 24 hr capsule TAKE 1 CAPSULE BY MOUTH EVERYDAY AT BEDTIME        Lab Results   Component Value Date    RF <10 02/15/2023    SEDRATE 17 02/15/2023    CRP 0.50 02/15/2023    URICACID 4.9 02/15/2023        Small Joint Injection/Arthrocentesis: R thumb CMC on 5/24/2024 9:54 AM  Details: 25 G needle, medial approach  Medications: 20 mg triamcinolone acetonide 40 mg/mL; 0.5 mL lidocaine 10 mg/mL (1 %)  Outcome: tolerated well, no immediate complications  Immediately prior to procedure a time out was called to verify the correct patient, procedure, equipment, support staff and  site/side marked as required. Patient was prepped and draped in the usual sterile fashion.              Tk Urbano MD

## 2024-05-29 ENCOUNTER — OFFICE VISIT (OUTPATIENT)
Dept: OTOLARYNGOLOGY | Facility: CLINIC | Age: 69
End: 2024-05-29
Payer: COMMERCIAL

## 2024-05-29 VITALS — BODY MASS INDEX: 35.53 KG/M2 | WEIGHT: 262 LBS

## 2024-05-29 DIAGNOSIS — J32.9 CHRONIC SINUSITIS, UNSPECIFIED LOCATION: ICD-10-CM

## 2024-05-29 DIAGNOSIS — G47.33 OSA (OBSTRUCTIVE SLEEP APNEA): ICD-10-CM

## 2024-05-29 DIAGNOSIS — J34.2 NASAL SEPTAL DEVIATION: ICD-10-CM

## 2024-05-29 DIAGNOSIS — J32.9 RECURRENT SINUSITIS: ICD-10-CM

## 2024-05-29 PROCEDURE — 1159F MED LIST DOCD IN RCRD: CPT | Performed by: GENERAL PRACTICE

## 2024-05-29 PROCEDURE — 99204 OFFICE O/P NEW MOD 45 MIN: CPT | Performed by: GENERAL PRACTICE

## 2024-05-29 PROCEDURE — 4010F ACE/ARB THERAPY RXD/TAKEN: CPT | Performed by: GENERAL PRACTICE

## 2024-05-29 PROCEDURE — 1036F TOBACCO NON-USER: CPT | Performed by: GENERAL PRACTICE

## 2024-05-29 PROCEDURE — 3044F HG A1C LEVEL LT 7.0%: CPT | Performed by: GENERAL PRACTICE

## 2024-05-29 PROCEDURE — 1160F RVW MEDS BY RX/DR IN RCRD: CPT | Performed by: GENERAL PRACTICE

## 2024-05-29 RX ORDER — TRIAMCINOLONE ACETONIDE 55 UG/1
2 SPRAY, METERED NASAL DAILY
Qty: 16.5 G | Refills: 2 | Status: SHIPPED | OUTPATIENT
Start: 2024-05-29

## 2024-05-29 NOTE — PROGRESS NOTES
"Otolaryngology - Head and Neck Surgery Outpatient New Patient Visit Note        Assessment/Plan   Problem List Items Addressed This Visit    None  Visit Diagnoses         Codes    SERGIO (obstructive sleep apnea)     G47.33    Nasal septal deviation     J34.2    Relevant Orders    CT sinus wo IV contrast    Chronic sinusitis, unspecified location     J32.9    Recurrent sinusitis     J32.9    Relevant Medications    triamcinolone (Nasacort) 55 mcg nasal inhaler    Other Relevant Orders    CT sinus wo IV contrast            68yoM with severe SERGIO and limited CPAP tolerance due ot nasal obstruction/congestion.   Mild external leftward deviation and rightward deviated septum.   Incompletely controlled allergic rhinitis as well as recurrent sinusitis.  Will have pt add nasacort and saline irrigations to astelin management.   Will acquire CT sinus to aid in eval given visual obstruction with deviated septum.     Discussed possible procedures to improved nasal obstruction and aid in CPAP tolerance.   Discussed possible referral to sleep ENT given severity of SERGIO if there are ongoing cardiopulmonary concerns.          Follow up:  -plan for follow up in clinic as needed, after completion of ordered studies, and in 1-2 months    All of the above findings, impressions, treatment planning and follow up plans were discussed with the patient who indicated understanding.  the patient was instructed to contact or return to clinic sooner if symptoms/signs persist or worsen despite the above management.      Mp Zarate MD  Otolaryngology - Head and Neck Surgery            History Of Present Illness  Hipolito Asencio \"Mike\" is a 68 y.o. male presenting for concerns of severe SERGIO and limited CPAP tolerance.     Reports recently using CPAP for severe SERGIO, but struggles to breath while wearing it and often removes it overnight unintentionally.     Reports chronic nasal obstruction/congestion   Notes prior nasal trauma/fracture in 1980s with " some corrective procedure, unsure of details if procedure more than closed reduction.   Reports a history of allergic rhinitis with some improvement with astepro and advair.   No other medical management    Reports recurrent sinusitis approx 2x per year.      Chronic R>L nasal obstruction.    Prior smoker, quit 10y ago.                 Past Medical History  He has a past medical history of Personal history of other (healed) physical injury and trauma.    Surgical History  He has a past surgical history that includes Other surgical history (01/11/2019).     Social History  He reports that he has never smoked. He has never used smokeless tobacco. He reports current alcohol use of about 3.0 standard drinks of alcohol per week. He reports that he does not use drugs.    Family History  Family History   Problem Relation Name Age of Onset    Diabetes Other GRANDMOTHER         Allergies  Patient has no known allergies.    Review of Systems  ROS: Pertinent positives as noted in HPI.    - CONSTITUTIONAL: Does not report weight loss, fever or chills.    - HEENT:   Ear: Does not report tinnitus, vertigo, hearing loss, otalgia, otorrhea  Nose: Does not report  ,  , epistaxis, decreased smell  Throat: Does not report pain, dysphagia, odynophagia  Larynx: Does not report hoarseness,  difficulty breathing, pain with speaking (odynophonia)  Neck: Does not report new masses, pain, swelling  Face: Does not report sinus pain, pressure, swelling, numbness, weakness     - RESPIRATORY: Does not report SOB or cough.    - CV: Does not report palpitations or chest pain.     - GI: Does not report abdominal pain, nausea, vomiting or diarrhea.    - : Does not report dysuria or urinary frequency.    - MSK: Does not report myalgia or joint pain.    - SKIN: Does not report rash or pruritus.    - NEUROLOGICAL: Does not report headache or syncope.    - PSYCHIATRIC: Does not report recent changes in mood. Does not report anxiety or depression.          Physical Exam:     GENERAL:   Alert & Oriented to person, place and time; Normal affect and appearance. Well developed and well nourished. Conversant & cooperative with examination.     HEAD:   Normocephalic, atraumatic. No sinus tenderness to palpation. Normal parotid bilaterally. Normal facial strength.     NEUROLOGIC:   Cranial nerves II-XII grossly intact, gait WNL. Normal mood and affect.    EYES:   Extraocular movements intact. Pupils equal, round, reactive to light and accommodation. No nystagmus, no ptosis. no scleral injection.    EAR:   Normal auricle. No discomfort or TTP with manipulation.   Handheld otoscopic exam showed normal external auditory canals bilaterally. No purulence or EAC inflammation. Minimal cerumen.   Right tympanic membrane clear and mobile without evidence of perforation, retraction or middle ear effusion.   Left tympanic membrane clear and mobile without evidence of perforation, retraction or middle ear effusion.     NOSE:    Leftward  external deformity. No external nasal lesions, lacerations, or scars. Nasal tip symmetrical with normal nasal valves.   Nasal cavity with rightward deviated  septum, edematous  mucosa and turbinates. No lesions, masses, purulence or polyps.     OC/OP:   Mucous membranes moist, no masses, lesions or exudates.   Normal tongue, floor of mouth, teeth, gums, lips. Normal posterior pharyngeal wall.    Normal tonsils without erythema, exudate or obvious calculi     NECK:   No neck masses or thyroid enlargement. Trachea midline. No tenderness to palpation    LYMPHATIC:   No cervical lymphadenopathy.     RESPIRATORY:   Symmetric chest elevation & no retractions. No significant hoarseness. No increased work of breathing.    CV:   No clubbing or cyanosis. No obvious edema    Skin:   No facial rashes, vesicles or lesions.     Extremities:   No gross abnormalities      Clinic Procedure        Information review:  External sources (notes, imaging, lab results)  listed below personally reviewed to aid in medical decision making process.  - Sleep med note Boris 4/16/24  -PCM note Long 3/21/24  - PCM note Aury 4/17/24

## 2024-06-12 ENCOUNTER — HOSPITAL ENCOUNTER (OUTPATIENT)
Dept: RADIOLOGY | Facility: CLINIC | Age: 69
Discharge: HOME | End: 2024-06-12
Payer: COMMERCIAL

## 2024-06-12 DIAGNOSIS — J34.2 NASAL SEPTAL DEVIATION: ICD-10-CM

## 2024-06-12 DIAGNOSIS — J32.9 RECURRENT SINUSITIS: ICD-10-CM

## 2024-06-12 PROCEDURE — 70486 CT MAXILLOFACIAL W/O DYE: CPT | Performed by: RADIOLOGY

## 2024-06-12 PROCEDURE — 70486 CT MAXILLOFACIAL W/O DYE: CPT

## 2024-06-20 ENCOUNTER — OFFICE VISIT (OUTPATIENT)
Dept: SLEEP MEDICINE | Facility: CLINIC | Age: 69
End: 2024-06-20
Payer: COMMERCIAL

## 2024-06-20 VITALS
HEART RATE: 68 BPM | HEIGHT: 72 IN | BODY MASS INDEX: 35.76 KG/M2 | RESPIRATION RATE: 16 BRPM | SYSTOLIC BLOOD PRESSURE: 151 MMHG | OXYGEN SATURATION: 97 % | TEMPERATURE: 97.9 F | DIASTOLIC BLOOD PRESSURE: 88 MMHG | WEIGHT: 264 LBS

## 2024-06-20 DIAGNOSIS — I10 BENIGN ESSENTIAL HYPERTENSION: ICD-10-CM

## 2024-06-20 DIAGNOSIS — E66.9 OBESITY (BMI 30-39.9): ICD-10-CM

## 2024-06-20 DIAGNOSIS — G47.33 OSA ON CPAP: Primary | ICD-10-CM

## 2024-06-20 PROCEDURE — 1160F RVW MEDS BY RX/DR IN RCRD: CPT | Performed by: INTERNAL MEDICINE

## 2024-06-20 PROCEDURE — 1159F MED LIST DOCD IN RCRD: CPT | Performed by: INTERNAL MEDICINE

## 2024-06-20 PROCEDURE — 3079F DIAST BP 80-89 MM HG: CPT | Performed by: INTERNAL MEDICINE

## 2024-06-20 PROCEDURE — 99214 OFFICE O/P EST MOD 30 MIN: CPT | Performed by: INTERNAL MEDICINE

## 2024-06-20 PROCEDURE — 1036F TOBACCO NON-USER: CPT | Performed by: INTERNAL MEDICINE

## 2024-06-20 PROCEDURE — 3044F HG A1C LEVEL LT 7.0%: CPT | Performed by: INTERNAL MEDICINE

## 2024-06-20 PROCEDURE — 4010F ACE/ARB THERAPY RXD/TAKEN: CPT | Performed by: INTERNAL MEDICINE

## 2024-06-20 PROCEDURE — 3077F SYST BP >= 140 MM HG: CPT | Performed by: INTERNAL MEDICINE

## 2024-06-20 PROCEDURE — G2211 COMPLEX E/M VISIT ADD ON: HCPCS | Performed by: INTERNAL MEDICINE

## 2024-06-20 ASSESSMENT — SLEEP AND FATIGUE QUESTIONNAIRES
HOW LIKELY ARE YOU TO NOD OFF OR FALL ASLEEP WHILE LYING DOWN TO REST IN THE AFTERNOON WHEN CIRCUMSTANCES PERMIT: SLIGHT CHANCE OF DOZING
HOW LIKELY ARE YOU TO NOD OFF OR FALL ASLEEP WHEN YOU ARE A PASSENGER IN A CAR FOR AN HOUR WITHOUT A BREAK: WOULD NEVER DOZE
HOW LIKELY ARE YOU TO NOD OFF OR FALL ASLEEP WHILE WATCHING TV: MODERATE CHANCE OF DOZING
HOW LIKELY ARE YOU TO NOD OFF OR FALL ASLEEP WHILE SITTING AND TALKING TO SOMEONE: WOULD NEVER DOZE
SITING INACTIVE IN A PUBLIC PLACE LIKE A CLASS ROOM OR A MOVIE THEATER: WOULD NEVER DOZE
HOW LIKELY ARE YOU TO NOD OFF OR FALL ASLEEP IN A CAR, WHILE STOPPED FOR A FEW MINUTES IN TRAFFIC: WOULD NEVER DOZE
HOW LIKELY ARE YOU TO NOD OFF OR FALL ASLEEP WHILE SITTING QUIETLY AFTER LUNCH WITHOUT ALCOHOL: WOULD NEVER DOZE
ESS-CHAD TOTAL SCORE: 5
HOW LIKELY ARE YOU TO NOD OFF OR FALL ASLEEP WHILE SITTING AND READING: MODERATE CHANCE OF DOZING

## 2024-06-20 NOTE — PROGRESS NOTES
"[facility] SLEEP MEDICINE CLINIC  FOLLOW-UP VISIT NOTE    PCP: Brian Alves MD    HISTORY OF PRESENT ILLNESS     Patient ID: Hipolito Asencio \"Leslye" is a 68 y.o. male who presents for follow-up of SERGIO after new machine setup.     Patient is here alone today.  To review, patient's medical history is notable for obesity (BMI 35), HTN, seasonal allergies, exercise-induced asthma, GERD, BPH, and SERGIO on CPAP.      Interval History  Patient was last seen in 4/16/2024. Plan was to start PAP therapy.  Since last visit, patient has been using machine intermittently due to possible pressure intolerance as he appeared to be taking off mask without recall in the middle of night while some days, he forgets to put mask on.  Currently he is using Dreamwear FFM which he liked very much. Tried nasal pillows but was still having mouth breathing. Tried Airfit F20 FFM but was getting panic attack when he used it.   Patient denies machine problems, mask leak, air hunger, aerophagia, dry mouth, skin irritation, and nasal congestion.   The following are patient's perceived benefits of PAP: no snoring on PAP, refreshing sleep, decreased daytime sleepiness and/or fatigue, and decreased nocturnal awakening  Lastly, nasal congestion is improved with using Flonase and saline spray at bedtime. Flonase nasal spray worked great with nasal congestion but it makes his nostrils dry. On the other hand, saline spray keeps nostrils moist. For dry mouth, it is much better with Biotene gargle at bedtime.    RLS Follow-up:   Denies    SLEEP STUDY HISTORY (personally reviewed raw data such as interpretation report, data sheet, hypnogram, and titration table if available and applicable)  PSG  3/5/2024: RDI3% 69.8 (supine RDI3% 104.8, non-supine RDI3% 64.3), RDI4% 62.5 (supine RDI4% 99.7, non-supine RDI4% 56.7), SpO2 ioana 73%, spent 43.1 mins with SpO2<89%, PLM index 23. Pt met split criteria but did not tolerate masks and machines; hence, study " was continued as diagnostic PSG. Resmed Airfit P10 nasal pillows and F&P Antolin FFM were tried. Both CPAP and BPAP were also tried.     SLEEP-WAKE SCHEDULE  Bedtime:  11 PM to 12 MN daily  Subjective sleep latency: 10-15 minutes  Difficulty falling asleep: No  Number of awakenings:  2 times per night spontaneously for unknown reasons without CPAP, sleeps thru night most night on CPAP but sometimes may wake up 1-2 times to go to bathroom  Nocturia: No  Falls back asleep right away  Final wake time:  7 AM to 7:30 AM  daily  Out of bed time:  7 AM to 7:30 AM daily  Shift work: No   Naps: once daily for 20 minutes without CPAP, no napping since CPAP was started  Average sleep duration (excluding naps): 6-7 hours    SLEEP ENVIRONMENT  Sleep location: bed  Sleep status: sleeps alone while spouse sleeps in a different room  Preferred sleep position: side    SOCIAL HISTORY  Smoking: quit 7 years ago. He used to smoke 1 pack for 3 days for 30 years  ETOH: 2-3 drinks per week  Marijuana: no  Caffeine: 2 cups coffee daily in the morning  Sleep aids: no    WEIGHT: stable    Claustrophobia: No     REVIEW OF SYSTEMS     All other systems have been reviewed and are negative.    ALLERGIES     No Known Allergies    MEDICATIONS     Current Outpatient Medications   Medication Sig Dispense Refill    albuterol 90 mcg/actuation inhaler INHALE 1 PUFF BY MOUTH EVERY 4 HOURS AS NEEDED 8 g 3    azelastine (Astelin) 137 mcg (0.1 %) nasal spray Administer 1 spray into each nostril 2 times a day. Shake gently then remove cap and point spray tip sideways toward your ears before applying. After use, clean tip. 30 mL 12    fluticasone propion-salmeteroL (Advair Diskus) 100-50 mcg/dose diskus inhaler INHALE 1 PUFF BY MOUTH EVERY 12 HOURS 60 each 3    losartan (Cozaar) 100 mg tablet Take 1 tablet (100 mg) by mouth once daily. 100 tablet 3    meloxicam (Mobic) 15 mg tablet Take 1 tablet (15 mg) by mouth once daily. Take with food. 90 tablet 1     "metFORMIN  mg 24 hr tablet 3 TABS PER DAY EVERY  tablet 1    montelukast (Singulair) 10 mg tablet TAKE 1 TABLET BY MOUTH EVERY DAY AS DIRECTED 90 tablet 1    omeprazole OTC (PriLOSEC OTC) 20 mg EC tablet Take 1 tablet (20 mg) by mouth. Do not crush, chew, or split.      rosuvastatin (Crestor) 10 mg tablet TAKE 1 TABLET BY MOUTH EVERYDAY AT BEDTIME 90 tablet 1    tadalafil (Cialis) 5 mg tablet Take 1 tablet (5 mg) by mouth once daily. 30 tablet 11    tamsulosin (Flomax) 0.4 mg 24 hr capsule TAKE 1 CAPSULE BY MOUTH EVERYDAY AT BEDTIME 90 capsule 1    triamcinolone (Nasacort) 55 mcg nasal inhaler Administer 2 sprays into each nostril once daily. 16.5 g 2    benzonatate (Tessalon) 200 mg capsule 1 CAPSULE ORALLY EVERY 8 HOURS, FOR 10 DAYS.      predniSONE (Deltasone) 20 mg tablet TAKE 2 TABLETS ORALLY ONCE A DAY (MORNING), FOR 5 DAYS. WITH FOOD.       No current facility-administered medications for this visit.       Active Problems, Allergy List, Medication List, and PMH/PSH/FH/Social Hx have been reviewed and reconciled in chart. No significant changes unless documented in the pertinent chart section. Updates made when necessary.       PHYSICAL EXAM     VITAL SIGNS: /88   Pulse 68   Temp 36.6 °C (97.9 °F)   Resp 16   Ht 1.829 m (6')   Wt 120 kg (264 lb)   SpO2 97%   BMI 35.80 kg/m²     NECK CIRCUMFERENCE: 17.5 inches    Today ESS: 5  Last visit ESS: 11  RODERICK: 18    Physical Exam  Constitutional: Awake, not in distress  Skin: Warm, no rash  Neuro: No tremors, moves all extremities  Psych: alert and oriented to time, place, and person    RESULTS/DATA     No results found for: \"IRON\", \"TRANSFERRIN\", \"IRONSAT\", \"TIBC\", \"FERRITIN\"    Bicarbonate   Date Value Ref Range Status   03/19/2024 29 21 - 32 mmol/L Final       PAP Adherence  A PAP adherence data was obtained and reviewed personally today in clinic. (see scanned document in EPIC)  Machine: Resmed Airsense 11 Autoset  Settings:  auto-CPAP 5-15 " " cm H2O and EPR off  Date Range: 5/20/2024 to 6/18/2024  Days used: 19/30  >4 hrs usage: 3%  Average usage hours (days used): 2 hours 5 minutes  Pressure: Median 7.3, 95th percentile 9.8, Maximum 10.3  Leak: 95th percentile 8.3, maximum 12.8  AHI: 1.3 (RERA index 0.3, DONNELL 0.4)    ASSESSMENT/PLAN     Hipolito Asencio \"Leslye" is a 68 y.o. male who returns in [facility] for the following problems:    OBSTRUCTIVE SLEEP APNEA, SEVERE positional (PSG RDI3% 69.8, RDI4% 62.5)  SLEEP-RELATED HYPOXEMIA  - Now on auto-CPAP 5-15 cm H2O and EPR off  - PAP adherence data reviewed today. Usage days 19/30, days> 4 hours at 3%, residual AHI 1.3.   - Due to pressure intolerance, changed settings in clinic today to auto-CPAP 5-12 cm H2O and EPR 3. Advised patient to continue using machine every night all night and if napping more than 30 minutes.  - Continue supportive management as follows: Lose weight. Stay off your back when sleeping. Avoid smoking, alcohol, and sedating medications if applicable. Don't drive when sleepy.    DREAM ENACTMENT BEHAVIOR  - Screaming in dream or flailing arms with last episode 3-4 months ago  - Patient denies history of injury or wife from acting out dreams  - Patient denies falling from bed     SEASONAL ALLERGIES  - Fluticasone nasal spray improved his nasal congestion but it makes his nostrils dry; hence, he also uses saline nasal spray to counteract the dryness of nostrils with Flonase use.  - Also, takes Montelukast and Azelastine nasal spray     OBESITY  - Recommend weight loss with diet and exercise.  - Weight loss can help in long term management of SERGIO.  - Defer management to PCP     HYPERTENSION  - BP slightly high today.   - Untreated  or inadequately treated sleep apnea can lead to new onset hypertension, resistant hypertension, or refractory hypertension.  - Continue anti-hypertensive medications per PCP.  - Supportive management: low salt DASH diet (less than " 2000 mg sodium intake daily), moderate intensity aerobic exercise at least 30 minutes 5 days per week, reduce stress, quit smoking, limit alcohol, lose weight, and monitor BP once daily  - PCP following. Defer management to PCP    Follow-up in 3 months    All of patient's questions were answered. He verbalizes understanding and agreement with my assessment and plan. Refer to after-visit-summary (AVS) for patient education and if applicable, for more details on sleep study preparation, troubleshooting issues with PAP usage, proper cleaning instructions of PAP supplies, and usual recommended replacement schedule for each of the PAP supplies.

## 2024-06-26 DIAGNOSIS — J45.990 EXERCISE INDUCED BRONCHOSPASM (HHS-HCC): ICD-10-CM

## 2024-06-26 RX ORDER — MONTELUKAST SODIUM 10 MG/1
TABLET ORAL
Qty: 90 TABLET | Refills: 1 | Status: SHIPPED | OUTPATIENT
Start: 2024-06-26

## 2024-07-15 ENCOUNTER — APPOINTMENT (OUTPATIENT)
Dept: OTOLARYNGOLOGY | Facility: CLINIC | Age: 69
End: 2024-07-15
Payer: COMMERCIAL

## 2024-07-15 VITALS — WEIGHT: 264 LBS | BODY MASS INDEX: 35.8 KG/M2

## 2024-07-15 DIAGNOSIS — H61.23 BILATERAL IMPACTED CERUMEN: ICD-10-CM

## 2024-07-15 DIAGNOSIS — J34.2 DEVIATED SEPTUM: ICD-10-CM

## 2024-07-15 DIAGNOSIS — G47.33 OSA ON CPAP: Primary | ICD-10-CM

## 2024-07-15 DIAGNOSIS — J31.0 CHRONIC RHINITIS: ICD-10-CM

## 2024-07-15 PROCEDURE — 1036F TOBACCO NON-USER: CPT | Performed by: GENERAL PRACTICE

## 2024-07-15 PROCEDURE — 99213 OFFICE O/P EST LOW 20 MIN: CPT | Performed by: GENERAL PRACTICE

## 2024-07-15 PROCEDURE — 69210 REMOVE IMPACTED EAR WAX UNI: CPT | Performed by: GENERAL PRACTICE

## 2024-07-15 PROCEDURE — 1159F MED LIST DOCD IN RCRD: CPT | Performed by: GENERAL PRACTICE

## 2024-07-15 PROCEDURE — 3044F HG A1C LEVEL LT 7.0%: CPT | Performed by: GENERAL PRACTICE

## 2024-07-15 PROCEDURE — 4010F ACE/ARB THERAPY RXD/TAKEN: CPT | Performed by: GENERAL PRACTICE

## 2024-07-15 NOTE — PROGRESS NOTES
"Otolaryngology - Head and Neck Surgery Outpatient New Patient Visit Note        Assessment/Plan:   Problem List Items Addressed This Visit             ICD-10-CM       Sleep    SERGIO on CPAP - Primary G47.33     Other Visit Diagnoses         Codes    Deviated septum     J34.2    Chronic rhinitis     J31.0    Bilateral impacted cerumen     H61.23            Improved CPAP tolerance and nasal obstruction with controls for rhinitis.  Discussed ongoing use of nasacort and consider saline gel.      Cerumen L>R debrided. No otitis.   Follow up:  -plan for follow up in clinic as needed    All of the above findings, impressions, treatment planning and follow up plans were discussed with the patient who indicated understanding.  the patient was instructed to contact or return to clinic sooner if symptoms/signs persist or worsen despite the above management.      Mp Zarate MD  Otolaryngology - Head and Neck Surgery            History Of Present Illness  Hipolito Asencio \"Mike\" is a 68 y.o. male presenting for follow up of CT sinus.  Reports improvement in nasal obstruction and CPAP tolerance with nasacort use.  Some dryness sensation.    No uncontrolled allergic rhinitis or symptoms of sinusitis.       The patient reports a history of ear wax buildup requiring debridement,    The pt reports gradual return of ear canal fullness and plugged sensation.  Reports some muffled hearing.    Denies otalgia, otorrhea.    Denies recent significant history of otitis media or externa.    Denies prior significant history of otologic surgery or trauma.       Recall    Hipolito Asencio \"Mike\" is a 68 y.o. male presenting for concerns of severe SERGIO and limited CPAP tolerance.      Reports recently using CPAP for severe SERGIO, but struggles to breath while wearing it and often removes it overnight unintentionally.      Reports chronic nasal obstruction/congestion   Notes prior nasal trauma/fracture in 1980s with some corrective procedure, unsure of " details if procedure more than closed reduction.   Reports a history of allergic rhinitis with some improvement with astepro and advair.   No other medical management     Reports recurrent sinusitis approx 2x per year.       Chronic R>L nasal obstruction.     Prior smoker, quit 10y ago.       Past Medical History  He has a past medical history of Personal history of other (healed) physical injury and trauma.    Surgical History  He has a past surgical history that includes Other surgical history (01/11/2019).     Social History  He reports that he has never smoked. He has never used smokeless tobacco. He reports current alcohol use of about 3.0 standard drinks of alcohol per week. He reports that he does not use drugs.    Family History  Family History   Problem Relation Name Age of Onset    Diabetes Other GRANDMOTHER         Allergies  Patient has no known allergies.    Review of Systems  ROS: Pertinent positives as noted in HPI.    - CONSTITUTIONAL: Does not report weight loss, fever or chills.    - HEENT:   Ear: Does not report tinnitus, vertigo, hearing loss, otalgia, otorrhea  Nose: Does not report  ,  , epistaxis, decreased smell  Throat: Does not report pain, dysphagia, odynophagia  Larynx: Does not report hoarseness,  difficulty breathing, pain with speaking (odynophonia)  Neck: Does not report new masses, pain, swelling  Face: Does not report sinus pain, pressure, swelling, numbness, weakness     - RESPIRATORY: Does not report SOB or cough.    - CV: Does not report palpitations or chest pain.     - GI: Does not report abdominal pain, nausea, vomiting or diarrhea.    - : Does not report dysuria or urinary frequency.    - MSK: Does not report myalgia or joint pain.    - SKIN: Does not report rash or pruritus.    - NEUROLOGICAL: Does not report headache or syncope.    - PSYCHIATRIC: Does not report recent changes in mood. Does not report anxiety or depression.         Physical Exam:     GENERAL:   Alert &  Oriented to person, place and time; Normal affect and appearance. Well developed and well nourished. Conversant & cooperative with examination.     HEAD:   Normocephalic, atraumatic. No sinus tenderness to palpation. Normal parotid bilaterally. Normal facial strength.     NEUROLOGIC:   Cranial nerves II-XII grossly intact, gait WNL. Normal mood and affect.    EYES:   Extraocular movements intact. Pupils equal, round, reactive to light and accommodation. No nystagmus, no ptosis. no scleral injection.    EAR:   Normal auricle. No discomfort or TTP with manipulation.   Handheld otoscopic exam showed normal external auditory canals bilaterally. No purulence or EAC inflammation.  Obstructive cerumen L>R.    Right tympanic membrane clear and mobile without evidence of perforation, retraction or middle ear effusion.   Left tympanic membrane clear and mobile without evidence of perforation, retraction or middle ear effusion.     NOSE:   No external deformity. No external nasal lesions, lacerations, or scars. Nasal tip symmetrical with normal nasal valves.   Nasal cavity with rightward  septum, normal mucosa and turbinates. No lesions, masses, purulence or polyps.     OC/OP:   Mucous membranes moist, no masses, lesions or exudates.   Normal tongue, floor of mouth, teeth, gums, lips. Normal posterior pharyngeal wall.    Normal tonsils without erythema, exudate or obvious calculi     NECK:   No neck masses or thyroid enlargement. Trachea midline. No tenderness to palpation    LYMPHATIC:   No cervical lymphadenopathy.     RESPIRATORY:   Symmetric chest elevation & no retractions. No significant hoarseness. No increased work of breathing.    CV:   No clubbing or cyanosis. No obvious edema    Skin:   No facial rashes, vesicles or lesions.     Extremities:   No gross abnormalities      Clinic Procedure    Binocular microscopy exam  Indication: tympanic membrane(s) could not be visualized adequately with handheld otoscopy.    Location:  bilateral ears  Visualization Instrument: A microscope was used to visualize through a speculum placed in the ear canal(s) to visualize the ear canal, tympanic membranes and to assist in assessment and removal of debris.  Findings:  see physical exam documentation  Patient Status: The patient tolerated the procedure well.   Complications: There were no complications.     Information review:  External sources (notes, imaging, lab results) listed below personally reviewed to aid in medical decision making process.  -  -  -

## 2024-08-17 DIAGNOSIS — E11.9 TYPE 2 DIABETES MELLITUS WITHOUT COMPLICATION, WITHOUT LONG-TERM CURRENT USE OF INSULIN (MULTI): ICD-10-CM

## 2024-08-17 DIAGNOSIS — R32 UNSPECIFIED URINARY INCONTINENCE: ICD-10-CM

## 2024-08-19 RX ORDER — METFORMIN HYDROCHLORIDE 500 MG/1
TABLET, EXTENDED RELEASE ORAL
Qty: 270 TABLET | Refills: 1 | Status: SHIPPED | OUTPATIENT
Start: 2024-08-19

## 2024-08-21 RX ORDER — TAMSULOSIN HYDROCHLORIDE 0.4 MG/1
CAPSULE ORAL
Qty: 30 CAPSULE | Refills: 2 | Status: SHIPPED | OUTPATIENT
Start: 2024-08-21

## 2024-09-21 DIAGNOSIS — J45.909 UNSPECIFIED ASTHMA, UNCOMPLICATED (HHS-HCC): ICD-10-CM

## 2024-09-23 RX ORDER — FLUTICASONE PROPIONATE AND SALMETEROL 100; 50 UG/1; UG/1
1 POWDER RESPIRATORY (INHALATION) EVERY 12 HOURS
Qty: 60 EACH | Refills: 3 | Status: SHIPPED | OUTPATIENT
Start: 2024-09-23

## 2024-09-26 ENCOUNTER — APPOINTMENT (OUTPATIENT)
Dept: SLEEP MEDICINE | Facility: CLINIC | Age: 69
End: 2024-09-26
Payer: COMMERCIAL

## 2024-10-31 DIAGNOSIS — J32.9 RECURRENT SINUSITIS: ICD-10-CM

## 2024-10-31 RX ORDER — TRIAMCINOLONE ACETONIDE 55 UG/1
2 SPRAY, METERED NASAL DAILY
Qty: 17 ML | Refills: 2 | Status: SHIPPED | OUTPATIENT
Start: 2024-10-31

## 2024-12-02 ENCOUNTER — TELEPHONE (OUTPATIENT)
Dept: PRIMARY CARE | Facility: CLINIC | Age: 69
End: 2024-12-02
Payer: COMMERCIAL

## 2024-12-02 DIAGNOSIS — Z12.5 SCREENING FOR PROSTATE CANCER: ICD-10-CM

## 2024-12-02 DIAGNOSIS — E11.9 TYPE 2 DIABETES MELLITUS WITHOUT COMPLICATION, WITHOUT LONG-TERM CURRENT USE OF INSULIN (MULTI): Primary | ICD-10-CM

## 2024-12-04 ENCOUNTER — LAB (OUTPATIENT)
Dept: LAB | Facility: LAB | Age: 69
End: 2024-12-04
Payer: COMMERCIAL

## 2024-12-04 DIAGNOSIS — Z12.5 SCREENING FOR PROSTATE CANCER: ICD-10-CM

## 2024-12-04 DIAGNOSIS — E11.9 TYPE 2 DIABETES MELLITUS WITHOUT COMPLICATION, WITHOUT LONG-TERM CURRENT USE OF INSULIN (MULTI): ICD-10-CM

## 2024-12-04 LAB
ALBUMIN SERPL BCP-MCNC: 4.3 G/DL (ref 3.4–5)
ALP SERPL-CCNC: 72 U/L (ref 33–136)
ALT SERPL W P-5'-P-CCNC: 28 U/L (ref 10–52)
ANION GAP SERPL CALC-SCNC: 14 MMOL/L (ref 10–20)
AST SERPL W P-5'-P-CCNC: 17 U/L (ref 9–39)
BILIRUB SERPL-MCNC: 0.5 MG/DL (ref 0–1.2)
BUN SERPL-MCNC: 21 MG/DL (ref 6–23)
CALCIUM SERPL-MCNC: 9.2 MG/DL (ref 8.6–10.6)
CHLORIDE SERPL-SCNC: 102 MMOL/L (ref 98–107)
CHOLEST SERPL-MCNC: 157 MG/DL (ref 0–199)
CHOLESTEROL/HDL RATIO: 3.8
CO2 SERPL-SCNC: 29 MMOL/L (ref 21–32)
CREAT SERPL-MCNC: 1.09 MG/DL (ref 0.5–1.3)
CREAT UR-MCNC: 151.4 MG/DL (ref 20–370)
EGFRCR SERPLBLD CKD-EPI 2021: 75 ML/MIN/1.73M*2
ERYTHROCYTE [DISTWIDTH] IN BLOOD BY AUTOMATED COUNT: 12.4 % (ref 11.5–14.5)
EST. AVERAGE GLUCOSE BLD GHB EST-MCNC: 146 MG/DL
GLUCOSE SERPL-MCNC: 132 MG/DL (ref 74–99)
HBA1C MFR BLD: 6.7 %
HCT VFR BLD AUTO: 42.5 % (ref 41–52)
HDLC SERPL-MCNC: 40.9 MG/DL
HGB BLD-MCNC: 14 G/DL (ref 13.5–17.5)
LDLC SERPL CALC-MCNC: 79 MG/DL
MCH RBC QN AUTO: 30.7 PG (ref 26–34)
MCHC RBC AUTO-ENTMCNC: 32.9 G/DL (ref 32–36)
MCV RBC AUTO: 93 FL (ref 80–100)
MICROALBUMIN UR-MCNC: <7 MG/L
MICROALBUMIN/CREAT UR: NORMAL MG/G{CREAT}
NON HDL CHOLESTEROL: 116 MG/DL (ref 0–149)
NRBC BLD-RTO: 0 /100 WBCS (ref 0–0)
PLATELET # BLD AUTO: 247 X10*3/UL (ref 150–450)
POTASSIUM SERPL-SCNC: 4.8 MMOL/L (ref 3.5–5.3)
PROT SERPL-MCNC: 6.6 G/DL (ref 6.4–8.2)
PSA SERPL-MCNC: 2.72 NG/ML
RBC # BLD AUTO: 4.56 X10*6/UL (ref 4.5–5.9)
SODIUM SERPL-SCNC: 140 MMOL/L (ref 136–145)
TRIGL SERPL-MCNC: 188 MG/DL (ref 0–149)
VLDL: 38 MG/DL (ref 0–40)
WBC # BLD AUTO: 7.2 X10*3/UL (ref 4.4–11.3)

## 2024-12-04 PROCEDURE — 85027 COMPLETE CBC AUTOMATED: CPT

## 2024-12-04 PROCEDURE — 83036 HEMOGLOBIN GLYCOSYLATED A1C: CPT

## 2024-12-04 PROCEDURE — 80061 LIPID PANEL: CPT

## 2024-12-04 PROCEDURE — 82043 UR ALBUMIN QUANTITATIVE: CPT

## 2024-12-04 PROCEDURE — 36415 COLL VENOUS BLD VENIPUNCTURE: CPT

## 2024-12-04 PROCEDURE — 82570 ASSAY OF URINE CREATININE: CPT

## 2024-12-04 PROCEDURE — 80053 COMPREHEN METABOLIC PANEL: CPT

## 2024-12-04 PROCEDURE — G0103 PSA SCREENING: HCPCS

## 2024-12-10 ENCOUNTER — APPOINTMENT (OUTPATIENT)
Dept: PRIMARY CARE | Facility: CLINIC | Age: 69
End: 2024-12-10
Payer: COMMERCIAL

## 2024-12-10 VITALS
OXYGEN SATURATION: 93 % | TEMPERATURE: 98.4 F | BODY MASS INDEX: 36.03 KG/M2 | HEART RATE: 79 BPM | SYSTOLIC BLOOD PRESSURE: 132 MMHG | DIASTOLIC BLOOD PRESSURE: 76 MMHG | RESPIRATION RATE: 18 BRPM | WEIGHT: 266 LBS | HEIGHT: 72 IN

## 2024-12-10 DIAGNOSIS — E66.812 CLASS 2 SEVERE OBESITY DUE TO EXCESS CALORIES WITH SERIOUS COMORBIDITY AND BODY MASS INDEX (BMI) OF 36.0 TO 36.9 IN ADULT: ICD-10-CM

## 2024-12-10 DIAGNOSIS — E11.9 TYPE 2 DIABETES MELLITUS WITHOUT COMPLICATION, WITHOUT LONG-TERM CURRENT USE OF INSULIN (MULTI): ICD-10-CM

## 2024-12-10 DIAGNOSIS — F33.9 DEPRESSION, RECURRENT (CMS-HCC): ICD-10-CM

## 2024-12-10 DIAGNOSIS — E66.01 CLASS 2 SEVERE OBESITY DUE TO EXCESS CALORIES WITH SERIOUS COMORBIDITY AND BODY MASS INDEX (BMI) OF 36.0 TO 36.9 IN ADULT: ICD-10-CM

## 2024-12-10 DIAGNOSIS — Z00.00 PHYSICAL EXAM: ICD-10-CM

## 2024-12-10 DIAGNOSIS — R53.83 OTHER FATIGUE: ICD-10-CM

## 2024-12-10 DIAGNOSIS — Z00.00 MEDICARE ANNUAL WELLNESS VISIT, INITIAL: Primary | ICD-10-CM

## 2024-12-10 DIAGNOSIS — M19.91 PRIMARY OSTEOARTHRITIS, UNSPECIFIED SITE: ICD-10-CM

## 2024-12-10 PROCEDURE — 3075F SYST BP GE 130 - 139MM HG: CPT | Performed by: INTERNAL MEDICINE

## 2024-12-10 PROCEDURE — 3048F LDL-C <100 MG/DL: CPT | Performed by: INTERNAL MEDICINE

## 2024-12-10 PROCEDURE — 99397 PER PM REEVAL EST PAT 65+ YR: CPT | Performed by: INTERNAL MEDICINE

## 2024-12-10 PROCEDURE — 99213 OFFICE O/P EST LOW 20 MIN: CPT | Performed by: INTERNAL MEDICINE

## 2024-12-10 PROCEDURE — 1159F MED LIST DOCD IN RCRD: CPT | Performed by: INTERNAL MEDICINE

## 2024-12-10 PROCEDURE — 1123F ACP DISCUSS/DSCN MKR DOCD: CPT | Performed by: INTERNAL MEDICINE

## 2024-12-10 PROCEDURE — 3062F POS MACROALBUMINURIA REV: CPT | Performed by: INTERNAL MEDICINE

## 2024-12-10 PROCEDURE — 3078F DIAST BP <80 MM HG: CPT | Performed by: INTERNAL MEDICINE

## 2024-12-10 PROCEDURE — 3008F BODY MASS INDEX DOCD: CPT | Performed by: INTERNAL MEDICINE

## 2024-12-10 PROCEDURE — 1125F AMNT PAIN NOTED PAIN PRSNT: CPT | Performed by: INTERNAL MEDICINE

## 2024-12-10 PROCEDURE — 1158F ADVNC CARE PLAN TLK DOCD: CPT | Performed by: INTERNAL MEDICINE

## 2024-12-10 PROCEDURE — G0438 PPPS, INITIAL VISIT: HCPCS | Performed by: INTERNAL MEDICINE

## 2024-12-10 PROCEDURE — 1170F FXNL STATUS ASSESSED: CPT | Performed by: INTERNAL MEDICINE

## 2024-12-10 PROCEDURE — 4010F ACE/ARB THERAPY RXD/TAKEN: CPT | Performed by: INTERNAL MEDICINE

## 2024-12-10 PROCEDURE — 1036F TOBACCO NON-USER: CPT | Performed by: INTERNAL MEDICINE

## 2024-12-10 PROCEDURE — 3044F HG A1C LEVEL LT 7.0%: CPT | Performed by: INTERNAL MEDICINE

## 2024-12-10 RX ORDER — MELOXICAM 15 MG/1
15 TABLET ORAL DAILY
Qty: 30 TABLET | Refills: 0 | OUTPATIENT
Start: 2024-12-10 | End: 2025-01-09

## 2024-12-10 RX ORDER — METFORMIN HYDROCHLORIDE 500 MG/1
TABLET, EXTENDED RELEASE ORAL
Qty: 360 TABLET | Refills: 1 | Status: SHIPPED | OUTPATIENT
Start: 2024-12-10

## 2024-12-10 ASSESSMENT — PATIENT HEALTH QUESTIONNAIRE - PHQ9
4. FEELING TIRED OR HAVING LITTLE ENERGY: MORE THAN HALF THE DAYS
6. FEELING BAD ABOUT YOURSELF - OR THAT YOU ARE A FAILURE OR HAVE LET YOURSELF OR YOUR FAMILY DOWN: NOT AT ALL
1. LITTLE INTEREST OR PLEASURE IN DOING THINGS: NOT AT ALL
9. THOUGHTS THAT YOU WOULD BE BETTER OFF DEAD, OR OF HURTING YOURSELF: NOT AT ALL
8. MOVING OR SPEAKING SO SLOWLY THAT OTHER PEOPLE COULD HAVE NOTICED. OR THE OPPOSITE, BEING SO FIGETY OR RESTLESS THAT YOU HAVE BEEN MOVING AROUND A LOT MORE THAN USUAL: NOT AT ALL
7. TROUBLE CONCENTRATING ON THINGS, SUCH AS READING THE NEWSPAPER OR WATCHING TELEVISION: NOT AT ALL
SUM OF ALL RESPONSES TO PHQ9 QUESTIONS 1 AND 2: 0
SUM OF ALL RESPONSES TO PHQ QUESTIONS 1-9: 4
3. TROUBLE FALLING OR STAYING ASLEEP OR SLEEPING TOO MUCH: MORE THAN HALF THE DAYS
2. FEELING DOWN, DEPRESSED OR HOPELESS: NOT AT ALL
10. IF YOU CHECKED OFF ANY PROBLEMS, HOW DIFFICULT HAVE THESE PROBLEMS MADE IT FOR YOU TO DO YOUR WORK, TAKE CARE OF THINGS AT HOME, OR GET ALONG WITH OTHER PEOPLE: NOT DIFFICULT AT ALL
5. POOR APPETITE OR OVEREATING: NOT AT ALL

## 2024-12-10 ASSESSMENT — ENCOUNTER SYMPTOMS
LOSS OF SENSATION IN FEET: 1
DEPRESSION: 1
OCCASIONAL FEELINGS OF UNSTEADINESS: 1

## 2024-12-10 ASSESSMENT — ACTIVITIES OF DAILY LIVING (ADL)
DRESSING: INDEPENDENT
BATHING: INDEPENDENT
TAKING_MEDICATION: INDEPENDENT
GROCERY_SHOPPING: INDEPENDENT
MANAGING_FINANCES: INDEPENDENT
DOING_HOUSEWORK: INDEPENDENT

## 2024-12-10 ASSESSMENT — ANXIETY QUESTIONNAIRES
3. WORRYING TOO MUCH ABOUT DIFFERENT THINGS: SEVERAL DAYS
6. BECOMING EASILY ANNOYED OR IRRITABLE: SEVERAL DAYS
4. TROUBLE RELAXING: NOT AT ALL
2. NOT BEING ABLE TO STOP OR CONTROL WORRYING: NOT AT ALL
5. BEING SO RESTLESS THAT IT IS HARD TO SIT STILL: NOT AT ALL
1. FEELING NERVOUS, ANXIOUS, OR ON EDGE: NOT AT ALL
IF YOU CHECKED OFF ANY PROBLEMS ON THIS QUESTIONNAIRE, HOW DIFFICULT HAVE THESE PROBLEMS MADE IT FOR YOU TO DO YOUR WORK, TAKE CARE OF THINGS AT HOME, OR GET ALONG WITH OTHER PEOPLE: NOT DIFFICULT AT ALL

## 2024-12-10 ASSESSMENT — PAIN SCALES - GENERAL: PAINLEVEL_OUTOF10: 6

## 2024-12-10 NOTE — PROGRESS NOTES
"PCP: Brian Alves MD    SUBJECTIVE:   Hipolito Asencio \"Mike\" is a 68 y.o. male presenting for his annual physical/wellness.    Pt with htn, dm, obesity, SERGIO.  Not tolerating masks well. Is working with Dr. Escalante.  Has arthritis in hands, wrists, and knees, ankles, back. Cannot walk much, because of back.   Some morning stiffness in hands, wrists, other joints. Lasting 10 minutes or so  He Complains of  feeling tired. Could be from under treated SERGIO. Libido is not as good as before. Will check testosterone    Colon screening: Up to date   Prostate screening:   Lab Results   Component Value Date    PSA 2.48 02/19/2020     Vaccines: declined today  Diet:  healthy.  Exercises: somewhat but limited by back pain.    Lab Results   Component Value Date    HGBA1C 6.7 (H) 12/04/2024     Lab Results   Component Value Date    CREATININE 1.09 12/04/2024     Lab Results   Component Value Date    CHOL 157 12/04/2024    CHOL 178 11/27/2023    CHOL 152 12/28/2022     Lab Results   Component Value Date    HDL 40.9 12/04/2024    HDL 39.2 11/27/2023    HDL 34.3 (A) 12/28/2022     Lab Results   Component Value Date    LDLCALC 79 12/04/2024    LDLCALC 75 11/27/2023     Lab Results   Component Value Date    TRIG 188 (H) 12/04/2024    TRIG 320 (H) 11/27/2023    TRIG 142 12/28/2022     No components found for: \"CHOLHDL\"    ROS:  Feeling well. No dyspnea or chest pain on exertion. No abdominal pain, change in bowel habits, black or bloody stools. No urinary tract or prostatic symptoms. No neurological complaints.       OBJECTIVE:   The patient appears well, alert, oriented x 3, in no distress.   /76 Comment: 132/76  Pulse 79   Temp 36.9 °C (98.4 °F)   Resp 18   Ht 1.829 m (6')   Wt 121 kg (266 lb)   SpO2 93%   BMI 36.08 kg/m²   ENT normal.  Neck supple. No adenopathy or thyromegaly. ISAIAS. Lungs are clear, good air entry, no wheezes, rhonchi or rales. S1 and S2 normal, no murmurs, regular rate and rhythm. Abdomen is soft without " tenderness, guarding, mass or organomegaly. Extremities show no edema, normal peripheral pulses. Neurological is normal without focal findings.      Assessment & Plan  Medicare annual wellness visit, initial         Physical exam         Type 2 diabetes mellitus without complication, without long-term current use of insulin (Multi)    Orders:    metFORMIN  mg 24 hr tablet; TAKE 4 TABLETS BY MOUTH ONCE PER DAY    Referral to Nutrition Services; Future    Depression, recurrent (CMS-HCC)  controlled       Other fatigue    Orders:    Testosterone, total and free; Future    FSH & LH; Future    Class 2 severe obesity due to excess calories with serious comorbidity and body mass index (BMI) of 36.0 to 36.9 in adult    Orders:    Referral to Nutrition Services; Future        Follow up 3 months, will consider Ozempic if hba1c is still in dm range

## 2024-12-10 NOTE — ASSESSMENT & PLAN NOTE
Orders:    metFORMIN  mg 24 hr tablet; TAKE 4 TABLETS BY MOUTH ONCE PER DAY    Referral to Nutrition Services; Future

## 2024-12-20 ENCOUNTER — LAB (OUTPATIENT)
Dept: LAB | Facility: LAB | Age: 69
End: 2024-12-20
Payer: COMMERCIAL

## 2024-12-20 DIAGNOSIS — R53.83 OTHER FATIGUE: ICD-10-CM

## 2024-12-20 LAB
FSH SERPL-ACNC: 10.8 IU/L
LH SERPL-ACNC: 4.7 IU/L

## 2024-12-20 PROCEDURE — 84402 ASSAY OF FREE TESTOSTERONE: CPT

## 2024-12-20 PROCEDURE — 83001 ASSAY OF GONADOTROPIN (FSH): CPT

## 2024-12-20 PROCEDURE — 36415 COLL VENOUS BLD VENIPUNCTURE: CPT

## 2024-12-20 PROCEDURE — 83002 ASSAY OF GONADOTROPIN (LH): CPT

## 2024-12-21 DIAGNOSIS — J45.990 EXERCISE INDUCED BRONCHOSPASM (HHS-HCC): ICD-10-CM

## 2024-12-22 RX ORDER — MONTELUKAST SODIUM 10 MG/1
TABLET ORAL
Qty: 90 TABLET | Refills: 1 | Status: SHIPPED | OUTPATIENT
Start: 2024-12-22

## 2024-12-27 LAB
TESTOSTERONE FREE (CHAN): 57.8 PG/ML (ref 35–155)
TESTOSTERONE,TOTAL,LC-MS/MS: 306 NG/DL (ref 250–1100)

## 2025-01-07 DIAGNOSIS — E78.5 HYPERLIPIDEMIA, UNSPECIFIED: ICD-10-CM

## 2025-01-07 RX ORDER — ROSUVASTATIN CALCIUM 10 MG/1
TABLET, COATED ORAL
Qty: 90 TABLET | Refills: 3 | Status: SHIPPED | OUTPATIENT
Start: 2025-01-07

## 2025-01-08 DIAGNOSIS — R32 UNSPECIFIED URINARY INCONTINENCE: ICD-10-CM

## 2025-01-10 ENCOUNTER — OFFICE VISIT (OUTPATIENT)
Dept: RHEUMATOLOGY | Facility: CLINIC | Age: 70
End: 2025-01-10
Payer: MEDICARE

## 2025-01-10 VITALS
DIASTOLIC BLOOD PRESSURE: 97 MMHG | BODY MASS INDEX: 35.89 KG/M2 | HEART RATE: 72 BPM | SYSTOLIC BLOOD PRESSURE: 167 MMHG | HEIGHT: 72 IN | WEIGHT: 265 LBS

## 2025-01-10 DIAGNOSIS — M19.041 PRIMARY OSTEOARTHRITIS OF BOTH HANDS: Primary | ICD-10-CM

## 2025-01-10 DIAGNOSIS — M19.042 PRIMARY OSTEOARTHRITIS OF BOTH HANDS: Primary | ICD-10-CM

## 2025-01-10 RX ORDER — TRIAMCINOLONE ACETONIDE 40 MG/ML
20 INJECTION, SUSPENSION INTRA-ARTICULAR; INTRAMUSCULAR
Status: COMPLETED | OUTPATIENT
Start: 2025-01-10 | End: 2025-01-10

## 2025-01-10 RX ORDER — TRIAMCINOLONE ACETONIDE 40 MG/ML
20 INJECTION, SUSPENSION INTRA-ARTICULAR; INTRAMUSCULAR ONCE
Status: SHIPPED | OUTPATIENT
Start: 2025-01-10

## 2025-01-10 RX ORDER — LIDOCAINE HYDROCHLORIDE 10 MG/ML
0.5 INJECTION, SOLUTION INFILTRATION; PERINEURAL
Status: COMPLETED | OUTPATIENT
Start: 2025-01-10 | End: 2025-01-10

## 2025-01-10 RX ORDER — LIDOCAINE HYDROCHLORIDE 10 MG/ML
0.5 INJECTION, SOLUTION INFILTRATION; PERINEURAL ONCE
Status: SHIPPED | OUTPATIENT
Start: 2025-01-10

## 2025-01-10 RX ADMIN — LIDOCAINE HYDROCHLORIDE 0.5 ML: 10 INJECTION, SOLUTION INFILTRATION; PERINEURAL at 09:54

## 2025-01-10 RX ADMIN — TRIAMCINOLONE ACETONIDE 20 MG: 40 INJECTION, SUSPENSION INTRA-ARTICULAR; INTRAMUSCULAR at 09:54

## 2025-01-10 ASSESSMENT — PAIN SCALES - GENERAL: PAINLEVEL_OUTOF10: 4

## 2025-01-10 NOTE — PROGRESS NOTES
"Subjective . Hipolito Asencio \"Mike\" is a 69 y.o. male who presents for Follow-up (Follow up).    HPI. 69-year-old male with history of OA, bilateral CTS, exercise-induced asthma, HTN, dyslipidemia and obesity presented for follow-up.     He reports pain in both thumbs.  Right thumb hurts more than the left.  Thumb pain gets worse with certain physical activities like lifting weights and performing ADLs.  Denies trauma to the thumb.  He has not noticed swelling, numbness and tingling of the thumbs.      Review of Systems   All other systems reviewed and are negative.    Objective     Blood pressure (!) 167/97, pulse 72, height 1.829 m (6'), weight 120 kg (265 lb).    Physical Exam.  Gen. AAO x3, NAD.  HEENT: No pallor or icterus, PERRLA, EOMI. No cervical lymphadenopathy .  Skin: No rashes.  Heart: S1, S2/ RRR.   Lungs: CTA B.  Abdomen: Soft, NT/ND, BS regular.  MSK: Bilateral CMC squaring.  Right thumb with mild tenderness and positive grinding test.    Neuro: Sensation to touch intact.Strength 5/5 throughout.   Psych:Appropriate mood and behavior  EXT: No edema    Assessment/Plan .    #1: Bilateral CMC osteoarthritis.  -Kenalog 20 mg mixed with 0.5 mL of 1% lidocaine injected to the right CMC joint.  -Discussed to take Tylenol as needed.    Follow-up as needed.     This note was partially generated using the Dragon Voice recognition system. There may be some incorrect wording, spelling and/or spelling errors or punctuation errors that were not corrected prior to committing the note to the medical record.        Problem List Items Addressed This Visit    None           Active Ambulatory Problems     Diagnosis Date Noted    Abdominal pain 01/24/2023    Acute bronchitis 01/24/2023    Cataract, nuclear sclerotic, both eyes 01/24/2023    Chest pain 01/24/2023    Depression, recurrent (CMS-HCC)     ED (erectile dysfunction) 01/24/2023    Fatigue 01/24/2023    Flashing lights seen 01/24/2023    Hemorrhoid 01/24/2023    " Hyperlipidemia 01/24/2023    Hypertension 01/24/2023    Localized primary osteoarthritis of carpometacarpal joint of right thumb 01/24/2023    Mixed type age-related cataract, both eyes 01/24/2023    Neck pain 01/24/2023    Numerous moles 01/24/2023    Osteoarthritis 01/24/2023    Peyronie disease 01/24/2023    Posterior vitreous detachment 01/24/2023    PVD (posterior vitreous detachment), left eye 01/24/2023    Presbyopia 01/24/2023    Refractive error 01/24/2023    Right elbow pain 01/24/2023    Left shoulder pain 01/24/2023    Wrist pain 01/24/2023    Wears reading eyeglasses 01/24/2023    Urinary incontinence 01/24/2023    Vitamin deficiency 01/24/2023    Asthma 09/05/2023    Exercise-induced asthma ([facility]-HCC) 09/05/2023    GERD without esophagitis 09/05/2023    Hand arthritis 09/05/2023    Knee pain, bilateral 09/05/2023    Numbness and tingling in both hands 09/05/2023    Obesity (BMI 30.0-34.9) 09/05/2023    Urgency incontinence 09/05/2023    Benign prostatic hyperplasia with urinary frequency 09/05/2023    Urinary frequency 09/05/2023    Shoulder pain 09/05/2023    Suspected sleep apnea 11/02/2023    Disorder of rotator cuff of both shoulders 01/26/2016    Costochondral chest pain 01/26/2016    Bilateral carpal tunnel syndrome 01/26/2016    Type 2 diabetes mellitus without complication, without long-term current use of insulin (Multi) 03/21/2024    SERGIO on CPAP 06/20/2024     Resolved Ambulatory Problems     Diagnosis Date Noted    No Resolved Ambulatory Problems     Past Medical History:   Diagnosis Date    Personal history of other (healed) physical injury and trauma        Family History   Problem Relation Name Age of Onset    Diabetes Other GRANDMOTHER        Past Surgical History:   Procedure Laterality Date    OTHER SURGICAL HISTORY  01/11/2019    No history of surgery       Social History     Tobacco Use   Smoking Status Never   Smokeless Tobacco Never       Allergies  Patient has no known  allergies.    Current Meds  Current Outpatient Medications   Medication Instructions    albuterol 90 mcg/actuation inhaler 1 puff, Every 4 hours PRN    azelastine (Astelin) 137 mcg (0.1 %) nasal spray 1 spray, Each Nostril, 2 times daily, Shake gently then remove cap and point spray tip sideways toward your ears before applying. After use, clean tip.    fluticasone propion-salmeteroL (Advair Diskus) 100-50 mcg/dose diskus inhaler 1 puff, inhalation, Every 12 hours    losartan (COZAAR) 100 mg, oral, Daily    metFORMIN  mg 24 hr tablet TAKE 4 TABLETS BY MOUTH ONCE PER DAY    montelukast (Singulair) 10 mg tablet TAKE 1 TABLET BY MOUTH EVERY DAY AS DIRECTED    omeprazole OTC (PRILOSEC OTC) 20 mg    rosuvastatin (Crestor) 10 mg tablet TAKE 1 TABLET BY MOUTH EVERYDAY AT BEDTIME    tadalafil (CIALIS) 5 mg, oral, Daily    tamsulosin (Flomax) 0.4 mg 24 hr capsule TAKE 1 CAPSULE BY MOUTH EVERYDAY AT BEDTIME    triamcinolone (Nasacort) 55 mcg nasal inhaler 2 sprays, Each Nostril, Daily        Lab Results   Component Value Date    RF <10 02/15/2023    SEDRATE 17 02/15/2023    CRP 0.50 02/15/2023    URICACID 4.9 02/15/2023        Small Joint Injection/Arthrocentesis: R thumb CMC on 1/10/2025 9:54 AM  Indications: pain  Details: 25 G needle, medial approach  Medications: 20 mg triamcinolone acetonide 40 mg/mL; 0.5 mL lidocaine 10 mg/mL (1 %)  Outcome: tolerated well, no immediate complications  Procedure, treatment alternatives, risks and benefits explained, specific risks discussed. Consent was given by the patient. Immediately prior to procedure a time out was called to verify the correct patient, procedure, equipment, support staff and site/side marked as required.              Tk Urbano MD

## 2025-01-14 DIAGNOSIS — M19.91 PRIMARY OSTEOARTHRITIS, UNSPECIFIED SITE: ICD-10-CM

## 2025-01-16 DIAGNOSIS — M19.041 PRIMARY OSTEOARTHRITIS OF BOTH HANDS: Primary | ICD-10-CM

## 2025-01-16 DIAGNOSIS — M19.042 PRIMARY OSTEOARTHRITIS OF BOTH HANDS: Primary | ICD-10-CM

## 2025-01-16 RX ORDER — MELOXICAM 15 MG/1
15 TABLET ORAL
COMMUNITY
Start: 2024-11-13 | End: 2025-01-16 | Stop reason: SDUPTHER

## 2025-01-16 RX ORDER — MELOXICAM 15 MG/1
15 TABLET ORAL DAILY
Qty: 30 TABLET | Refills: 0 | OUTPATIENT
Start: 2025-01-16 | End: 2025-02-15

## 2025-01-16 RX ORDER — MELOXICAM 15 MG/1
15 TABLET ORAL
Qty: 30 TABLET | Refills: 0 | Status: SHIPPED | OUTPATIENT
Start: 2025-01-16 | End: 2025-02-15

## 2025-01-16 NOTE — TELEPHONE ENCOUNTER
Was just seen in office on 1/10/24 meloxicam was not received at pharmacy it needs re sent in please

## 2025-01-19 NOTE — PROGRESS NOTES
Nutrition Initial Assessment:     Patient Hipolito Asencio is a 69 y.o. male being seen at [facility] who was referred by Dr. Alves on 12/6/24 for   1. Type 2 diabetes mellitus without complication, without long-term current use of insulin (Multi)        2. Obesity (BMI 30.0-34.9)            Nutrition Assessment    Patient Active Problem List   Diagnosis    Abdominal pain    Acute bronchitis    Cataract, nuclear sclerotic, both eyes    Chest pain    Depression, recurrent (CMS-HCC)    ED (erectile dysfunction)    Fatigue    Flashing lights seen    Hemorrhoid    Hyperlipidemia    Hypertension    Localized primary osteoarthritis of carpometacarpal joint of right thumb    Mixed type age-related cataract, both eyes    Neck pain    Numerous moles    Osteoarthritis    Peyronie disease    Posterior vitreous detachment    PVD (posterior vitreous detachment), left eye    Presbyopia    Refractive error    Right elbow pain    Left shoulder pain    Wrist pain    Wears reading eyeglasses    Urinary incontinence    Vitamin deficiency    Asthma    Exercise-induced asthma ([facility]HS-HCC)    GERD without esophagitis    Hand arthritis    Knee pain, bilateral    Numbness and tingling in both hands    Obesity (BMI 30.0-34.9)    Urgency incontinence    Benign prostatic hyperplasia with urinary frequency    Urinary frequency    Shoulder pain    Suspected sleep apnea    Disorder of rotator cuff of both shoulders    Costochondral chest pain    Bilateral carpal tunnel syndrome    Type 2 diabetes mellitus without complication, without long-term current use of insulin (Multi)    SERGIO on CPAP     Nutrition History:  Food & Nutrition History:    Food Allergies:    Food Intolerances:    Vitamin/mineral intake:       Prescription medications:    GI Symptoms:  ; Occurring >2 weeks?    Oral Problems:  ; Dentition:    Sleep Habits:      Diet Recall:  Meal 1:    Meal 2:    Meal 3:    Snacks:    Food Variety:    Oral Nutrition Supplement Use:     Fluid Intake:    Energy Intake:    {Nutrition Specialties Section. Does the patient need info charted for TF, TPN, OB, DM)? If Yes, click this smart list. If not, then skip & will appear as blank space in final note. (Optional):81768}    Food Preparation:  Cooking:    Grocery Shopping:    Dining Out:      Physical Activity:        Food Security/Insecurity:      Anthropometrics:                            Weight History:   Daily Weight  01/10/25 : 120 kg (265 lb)  12/10/24 : 121 kg (266 lb)  07/15/24 : 120 kg (264 lb)  06/20/24 : 120 kg (264 lb)  05/29/24 : 119 kg (262 lb)  05/24/24 : 119 kg (262 lb)  04/17/24 : 119 kg (262 lb)  04/16/24 : 119 kg (261 lb 6.4 oz)  04/05/24 : 118 kg (259 lb 14.8 oz)  03/21/24 : 119 kg (262 lb)    Weight Change %:       Nutrition Focused Physical Exam Findings:  Deferred as pt visually appears well-nourished with no signs of muscle or subcutaneous fat losses  Subcutaneous Fat Loss:        Muscle Wasting:       Physical Findings:          Nutrition Significant Labs:  CMP Trend:    Recent Labs     12/04/24  1039 03/19/24  0850 11/27/23  1035 12/28/22  0848   GLUCOSE 132* 129* 129* 140*    139 139 140   K 4.8 4.4 4.3 4.4    102 102 104   CO2 29 29 27 28   ANIONGAP 14 12 14 12   BUN 21 23 18 22   CREATININE 1.09 1.13 1.13 1.07   EGFR 75 72 72  --    CALCIUM 9.2 9.3 9.1 8.9   ALBUMIN 4.3  --  4.0 4.0   ALKPHOS 72  --  78 75   PROT 6.6  --  6.4 6.4   AST 17  --  19 17   BILITOT 0.5  --  0.4 0.5   ALT 28  --  28 23   , CBC Trend:   Recent Labs     12/04/24  1039 11/27/23  1035 12/28/22  0848   WBC 7.2 7.5 8.4   NRBC 0.0 0.0 0.0   RBC 4.56 4.61 4.64   HGB 14.0 14.0 14.1   HCT 42.5 42.3 42.6   MCV 93 92 92   MCH 30.7 30.4  --    MCHC 32.9 33.1 33.1   RDW 12.4 12.2 12.3    211 251   , RFP + Serum Mag Trend:   Recent Labs     12/04/24  1039 03/19/24  0850 11/27/23  1035 12/28/22  0848   GLUCOSE 132* 129* 129* 140*    139 139 140   K 4.8 4.4 4.3 4.4    102 102 104    CO2 29 29 27 28   ANIONGAP 14 12 14 12   BUN 21 23 18 22   CREATININE 1.09 1.13 1.13 1.07   EGFR 75 72 72  --    CALCIUM 9.2 9.3 9.1 8.9   ALBUMIN 4.3  --  4.0 4.0   , LFT Trend:   Recent Labs     12/04/24  1039 11/27/23  1035 12/28/22  0848   ALBUMIN 4.3 4.0 4.0   BILITOT 0.5 0.4 0.5   ALKPHOS 72 78 75   ALT 28 28 23   AST 17 19 17   PROT 6.6 6.4 6.4   , DM Specific Labs Trend (includes HgbA1C):   Recent Labs     12/04/24  1039 03/19/24  0850 11/27/23  1035   HGBA1C 6.7* 6.3* 6.8*   , and Lipid Panel Trend:    Recent Labs     12/04/24  1039 11/27/23  1035 12/28/22  0848 09/30/21  1059 02/19/20  1404   CHOL 157 178 152 234* 207*   HDL 40.9 39.2 34.3* 45.1 47.3   LDLCALC 79 75  --   --   --    LDLF  --   --  89 162* 142*   VLDL 38 64* 28 27 17   TRIG 188* 320* 142 133 87       Medications:  Current Outpatient Medications   Medication Instructions    albuterol 90 mcg/actuation inhaler 1 puff, Every 4 hours PRN    azelastine (Astelin) 137 mcg (0.1 %) nasal spray 1 spray, Each Nostril, 2 times daily, Shake gently then remove cap and point spray tip sideways toward your ears before applying. After use, clean tip.    fluticasone propion-salmeteroL (Advair Diskus) 100-50 mcg/dose diskus inhaler 1 puff, inhalation, Every 12 hours    losartan (COZAAR) 100 mg, oral, Daily    meloxicam (MOBIC) 15 mg, oral, Daily with breakfast    metFORMIN  mg 24 hr tablet TAKE 4 TABLETS BY MOUTH ONCE PER DAY    montelukast (Singulair) 10 mg tablet TAKE 1 TABLET BY MOUTH EVERY DAY AS DIRECTED    omeprazole OTC (PRILOSEC OTC) 20 mg    rosuvastatin (Crestor) 10 mg tablet TAKE 1 TABLET BY MOUTH EVERYDAY AT BEDTIME    tadalafil (CIALIS) 5 mg, oral, Daily    tamsulosin (Flomax) 0.4 mg 24 hr capsule TAKE 1 CAPSULE BY MOUTH EVERYDAY AT BEDTIME    triamcinolone (Nasacort) 55 mcg nasal inhaler 2 sprays, Each Nostril, Daily        Estimated Needs:   ;     ;     ;     ;           Nutrition Diagnosis                Nutrition Interventions:    1)  Reviewed diabetic diet including carbohydrate-containing food, appropriate carbs per meal as well as appropriate portion sizes. We discussed that 1 serving of a carbohydrate is equal to 15 gram and patient should consume no more than 3-4 servings per meal for weight maintenance or 2-3 per meal for weight loss. Education materials were provided and meal and snack options discussed. Aim for A1c <7%     2)     We reviewed the importance of having consistent meals and snacks throughout the day to improve or maintain good glycemic control and to increase metabolism to aid with weight loss. Pt should aim to consume a meal or snack every 3-4 hours which contains a lean protein (lean chicken, turkey, fish, eggs, low fat yogurt, nuts, peanut butter, bean) and healthy starch (fruits, whole grains)    3) We reviewed the food plate method to help improve healthy eating habits. We discussed that half of the plate should contain 2 non-starchy vegetables (all vegetable besides peas, corn and potatoes), 1/4 of the plate should contain lean protein and 1/4 of the plate should contain a healthy starch.      Nutrition Goals:  {Nutrition Goals (Optional):91393}    Nutrition Recommendations:  1)       {Educational Handouts JBL:67137}    Follow Up: {OPNOTEFOLLOWUP (Optional):90576}

## 2025-01-20 ENCOUNTER — APPOINTMENT (OUTPATIENT)
Dept: NUTRITION | Facility: CLINIC | Age: 70
End: 2025-01-20
Payer: MEDICARE

## 2025-01-20 DIAGNOSIS — E11.9 TYPE 2 DIABETES MELLITUS WITHOUT COMPLICATION, WITHOUT LONG-TERM CURRENT USE OF INSULIN (MULTI): Primary | ICD-10-CM

## 2025-01-20 DIAGNOSIS — E66.811 OBESITY (BMI 30.0-34.9): ICD-10-CM

## 2025-01-21 ENCOUNTER — APPOINTMENT (OUTPATIENT)
Dept: SLEEP MEDICINE | Facility: CLINIC | Age: 70
End: 2025-01-21
Payer: MEDICARE

## 2025-01-24 DIAGNOSIS — J45.909 UNSPECIFIED ASTHMA, UNCOMPLICATED (HHS-HCC): ICD-10-CM

## 2025-01-24 RX ORDER — TAMSULOSIN HYDROCHLORIDE 0.4 MG/1
CAPSULE ORAL
Qty: 90 CAPSULE | Refills: 3 | Status: SHIPPED | OUTPATIENT
Start: 2025-01-24

## 2025-01-26 RX ORDER — FLUTICASONE PROPIONATE AND SALMETEROL 100; 50 UG/1; UG/1
1 POWDER RESPIRATORY (INHALATION) EVERY 12 HOURS
Qty: 60 EACH | Refills: 3 | Status: SHIPPED | OUTPATIENT
Start: 2025-01-26

## 2025-02-02 DIAGNOSIS — J30.2 SEASONAL ALLERGIES: ICD-10-CM

## 2025-02-04 RX ORDER — AZELASTINE 1 MG/ML
SPRAY, METERED NASAL
Qty: 90 ML | Refills: 0 | Status: SHIPPED | OUTPATIENT
Start: 2025-02-04

## 2025-02-12 DIAGNOSIS — M19.042 PRIMARY OSTEOARTHRITIS OF BOTH HANDS: ICD-10-CM

## 2025-02-12 DIAGNOSIS — M19.041 PRIMARY OSTEOARTHRITIS OF BOTH HANDS: ICD-10-CM

## 2025-02-13 RX ORDER — MELOXICAM 15 MG/1
15 TABLET ORAL
Qty: 30 TABLET | Refills: 0 | Status: SHIPPED | OUTPATIENT
Start: 2025-02-13 | End: 2025-03-15

## 2025-02-18 NOTE — PROGRESS NOTES
Nutrition Initial Assessment:     Patient Hipolito Asencio is a 69 y.o. male being seen at [facility] who was referred by Dr Avles on 12/10/24 for   1. Obesity (BMI 30.0-34.9)        2. Type 2 diabetes mellitus without complication, without long-term current use of insulin (Multi)  Referral to Nutrition Services      3. Class 2 severe obesity due to excess calories with serious comorbidity and body mass index (BMI) of 36.0 to 36.9 in adult  Referral to Nutrition Services          Nutrition Assessment    Patient Active Problem List   Diagnosis    Abdominal pain    Acute bronchitis    Cataract, nuclear sclerotic, both eyes    Chest pain    Depression, recurrent (CMS-HCC)    ED (erectile dysfunction)    Fatigue    Flashing lights seen    Hemorrhoid    Hyperlipidemia    Hypertension    Localized primary osteoarthritis of carpometacarpal joint of right thumb    Mixed type age-related cataract, both eyes    Neck pain    Numerous moles    Osteoarthritis    Peyronie disease    Posterior vitreous detachment    PVD (posterior vitreous detachment), left eye    Presbyopia    Refractive error    Right elbow pain    Left shoulder pain    Wrist pain    Wears reading eyeglasses    Urinary incontinence    Vitamin deficiency    Asthma    Exercise-induced asthma ([facility])    GERD without esophagitis    Hand arthritis    Knee pain, bilateral    Numbness and tingling in both hands    Obesity (BMI 30.0-34.9)    Urgency incontinence    Benign prostatic hyperplasia with urinary frequency    Urinary frequency    Shoulder pain    Suspected sleep apnea    Disorder of rotator cuff of both shoulders    Costochondral chest pain    Bilateral carpal tunnel syndrome    Type 2 diabetes mellitus without complication, without long-term current use of insulin (Multi)    SERGIO on CPAP     Nutrition History:  Food & Nutrition History: Pt wants to lose weight, he is active but has been struggling.  Gets organic meat.  Worked at [employer] 12  hours overnight shift and was not eating well. He is retired now.  Food Allergies:  (mushrooms);  Food Intolerances: egg whites - makes sick, lactose  Vitamin/mineral intake: D, Multivitamin, B12 (MCT oil but not taking it now) Other (Comment) (fish oil, probiotics)  Herbal supplements:    Medication and Complementary/Alternative Medicine Use:    GI Symptoms:  (none)  Mouth Issues:  (none, upper dentures and implants); Teeth Issues:    Sleep Habits: 5-6 hrs disrupted (sleep apnea, tries to use c-pap but it's tough  Wake: 7-7:30 am)    Diet Recall:  Meal 1: B: none or a piece of toast -w/jelly or plant butter or PB - 8 am  or sometimes an egg or fish  Snack 1:    Meal 2: L: 1pm - sandwich - ham or turkey, cheese, alcantara or mustard, pickle, carrots or leftover or soup  Snack 2:    Meal 3: D: 6- 6:30 veal meatballs, noodles, tomatos, beans - soup or chicken, pepper, canned tomato,  sometimes sweet potato or quinoa or oliver rice, salad  Snack 3: granola bar or cheese or hummus/crackers  - snack on nuts, olives  Food Variety:  (likes fruit but doesn't always have, likes apples)  Oral Nutrition Supplement Use: Oral Nutrition Supplements: Premier Protein (powder to water or 2% milk, not everyday)        Fluid Intake: coffee - 2 cups, cold/hot tea - 1-2 cup, 32-48 oz water - adds a little flavor  Energy Intake: Good > 75 %    Diabetes Nutrition History:  Diagnosed:  1 year ago  Prior Nutrition Education:  none  SMBG: doesn't check  Hypoglycemia:   Current DM Medications/Insulin Regimen:  Metformin     Food Preparation:  Cooking: Patient  Grocery Shopping: Spouse/Significant Other  Dining Out: 1 to 3 times a month    Physical Activity:   30 min walking on track - heart rate is elevated - 3-4x./week, weights on machine 30 min    Food Security/Insecurity: Food / Nutrition Program Participation:  (none)    Anthropometrics:                            Weight History:   Daily Weight  01/10/25 : 120 kg (265 lb)  12/10/24 : 121 kg  (266 lb)  07/15/24 : 120 kg (264 lb)  06/20/24 : 120 kg (264 lb)  05/29/24 : 119 kg (262 lb)  05/24/24 : 119 kg (262 lb)  04/17/24 : 119 kg (262 lb)  04/16/24 : 119 kg (261 lb 6.4 oz)  04/05/24 : 118 kg (259 lb 14.8 oz)  03/21/24 : 119 kg (262 lb)    Weight Change %:  Weight History / % Weight Change: UBW - 210# - 50's    Nutrition Focused Physical Exam Findings:  Subcutaneous Fat Loss:   Defer Subcutaneous Fat Loss Assessment: Defer all    Muscle Wasting:  Defer Muscle Wasting Assessment: Defer all    Physical Findings:  Hair:    Eyes:    Nails:    Skin:    Respiratory:    Edema:        Nutrition Significant Labs:  Last Chem:     Chemistry    Lab Results   Component Value Date/Time     12/04/2024 1039    K 4.8 12/04/2024 1039     12/04/2024 1039    CO2 29 12/04/2024 1039    BUN 21 12/04/2024 1039    CREATININE 1.09 12/04/2024 1039    Lab Results   Component Value Date/Time    CALCIUM 9.2 12/04/2024 1039    ALKPHOS 72 12/04/2024 1039    AST 17 12/04/2024 1039    ALT 28 12/04/2024 1039    BILITOT 0.5 12/04/2024 1039       , CBC Trend:   Recent Labs     12/04/24  1039 11/27/23  1035 12/28/22  0848   WBC 7.2 7.5 8.4   NRBC 0.0 0.0 0.0   RBC 4.56 4.61 4.64   HGB 14.0 14.0 14.1   HCT 42.5 42.3 42.6   MCV 93 92 92   MCH 30.7 30.4  --    MCHC 32.9 33.1 33.1   RDW 12.4 12.2 12.3    211 251   , DM Specific Labs Trend (includes HgbA1C):   Recent Labs     12/04/24  1039 03/19/24  0850 11/27/23  1035   HGBA1C 6.7* 6.3* 6.8*   , Lipid Panel Trend:    Recent Labs     12/04/24  1039 11/27/23  1035 12/28/22  0848 09/30/21  1059 02/19/20  1404   CHOL 157 178 152 234* 207*   HDL 40.9 39.2 34.3* 45.1 47.3   LDLCALC 79 75  --   --   --    LDLF  --   --  89 162* 142*   VLDL 38 64* 28 27 17   TRIG 188* 320* 142 133 87   , and Vitamin D:   Lab Results   Component Value Date    VITD25 61 09/30/2021        Medications:  Current Outpatient Medications   Medication Instructions    albuterol 90 mcg/actuation inhaler 1 puff,  Every 4 hours PRN    azelastine (Astelin) 137 mcg (0.1 %) nasal spray PLEASE SEE ATTACHED FOR DETAILED DIRECTIONS    fluticasone propion-salmeteroL (Advair Diskus) 100-50 mcg/dose diskus inhaler 1 puff, inhalation, Every 12 hours    losartan (COZAAR) 100 mg, oral, Daily    meloxicam (MOBIC) 15 mg, oral, Daily with breakfast    metFORMIN  mg 24 hr tablet TAKE 4 TABLETS BY MOUTH ONCE PER DAY    montelukast (Singulair) 10 mg tablet TAKE 1 TABLET BY MOUTH EVERY DAY AS DIRECTED    omeprazole OTC (PRILOSEC OTC) 20 mg    rosuvastatin (Crestor) 10 mg tablet TAKE 1 TABLET BY MOUTH EVERYDAY AT BEDTIME    tadalafil (CIALIS) 5 mg, oral, Daily    tamsulosin (Flomax) 0.4 mg 24 hr capsule TAKE 1 CAPSULE BY MOUTH EVERYDAY AT BEDTIME    triamcinolone (Nasacort) 55 mcg nasal inhaler 2 sprays, Each Nostril, Daily        Estimated Needs:  Did not discuss   ;     ;     ;     ;                 Nutrition Diagnosis   Malnutrition Diagnosis  Patient has Malnutrition Diagnosis: No    Nutrition Diagnosis  Patient has Nutrition Diagnosis: Yes  Diagnosis Status (1): New  Nutrition Diagnosis 1: Food and nutrition related knowledge deficit  Related to (1): Lack of or limited prior nutrition-related education  As Evidenced by (1): diet recall, need for diabetic diet  Additional Nutrition Diagnosis: Diagnosis 2  Diagnosis Status (2): New  Nutrition Diagnosis 2: Altered nutrition related to laboratory values  Related to (2): organ dysfunction that leads to biochemical changes  As Evidenced by (2): elevated A1c of 6.7% up from 6.3%       Nutrition Interventions:    1) Reviewed diabetic diet including carbohydrate-containing food, appropriate carbs per meal as well as appropriate portion sizes. We discussed that 1 serving of a carbohydrate is equal to 15 gram and patient should consume no more than 3-4 servings per meal for weight maintenance or 2-3 per meal for weight loss. Education materials were provided and meal and snack options discussed.  Aim for A1c <7%     2)     We reviewed the importance of having consistent meals and snacks throughout the day to improve or maintain good glycemic control and to increase metabolism to aid with weight loss. Pt should aim to consume a meal or snack every 3-4 hours which contains a lean protein (lean chicken, turkey, fish, eggs, low fat yogurt, nuts, peanut butter, bean) and healthy starch (fruits, whole grains)    3) We reviewed the food plate method to help improve healthy eating habits. We discussed that half of the plate should contain 2 non-starchy vegetables (all vegetable besides peas, corn and potatoes), 1/4 of the plate should contain lean protein and 1/4 of the plate should contain a healthy starch.    4) We discussed the importance of following a heart healthy which is a low saturated fat, low cholesterol, low sodium diet. Choose foods with less than 5 grams (g) of total fat per serving. For someone who needs to eat 2,000 calories per day, 50 g to 75 g per day is a good range. Try to pick foods with heart-healthy fats (monounsaturated and polyunsaturated fats). Choose foods with less than 2 g per serving of saturated fat and 0 g of trans fat. (Saturated fat and trans fat are not heart-healthy.) A person who needs to eat 2,000 calories per day should eat no more than 11 g to 15 g of saturated fat in one day. Read ingredients listed on the label. If a food contains partially hydrogenated oils, then it has trans fat. (If it has less than half a gram per serving, the label may still say trans fat-free.)    5) This diet emphasizes plant-based foods like vegetables, fruits, grains, beans, peas, lentils, nuts, and seeds. These foods provide fiber, healthy fat, protein, vitamins, minerals, and other beneficial nutrients. The general, healthful Mediterranean diet may be lower in calories, sodium, added sugars, and saturated fat than other diets.    In addition to the general information presented here, your RDN may  make recommendations just for you based on your individual needs or personal and cultural preferences.    This diet emphasizes healthy fats, such as olives and olive oil, avocados, nuts, and seeds.  Less healthy fats are limited. Less healthy fats include certain red meats, high-fat dairy products, and processed foods like donuts and other baked goods.  The primary sources of protein in this diet are seafood, beans, peas, lentils, and nuts.     Nutrition Goals:  Nutrition Goals : A1c <7%  Eat 3 meals consistently  Eat breakfast consistently  Consume 64 oz water  Exercise continue with current regimen  Consume meal or snack every 3-4 hours  Include both protein and starch at all meals and snacks    Nutrition Recommendations:  1)  Continue to work on sleep and stress about sleep      Educational Handouts JBL: [facility] Weight Loss & Metabolism, Mediterranean Diet, and Breakfast Ideas    Follow Up: Patient declined nutrition follow up appointment. This service will remain available if patient wishes to schedule a follow up. Referral is good for 1 year (expires on 12/11/25). If patient wishes to schedule a follow up, can call Nutrition Scheduling Line at [phone].

## 2025-02-19 ENCOUNTER — NUTRITION (OUTPATIENT)
Dept: NUTRITION | Facility: CLINIC | Age: 70
End: 2025-02-19
Payer: MEDICARE

## 2025-02-19 DIAGNOSIS — E66.01 CLASS 2 SEVERE OBESITY DUE TO EXCESS CALORIES WITH SERIOUS COMORBIDITY AND BODY MASS INDEX (BMI) OF 36.0 TO 36.9 IN ADULT: ICD-10-CM

## 2025-02-19 DIAGNOSIS — E66.812 CLASS 2 SEVERE OBESITY DUE TO EXCESS CALORIES WITH SERIOUS COMORBIDITY AND BODY MASS INDEX (BMI) OF 36.0 TO 36.9 IN ADULT: ICD-10-CM

## 2025-02-19 DIAGNOSIS — E66.811 OBESITY (BMI 30.0-34.9): Primary | ICD-10-CM

## 2025-02-19 DIAGNOSIS — E11.9 TYPE 2 DIABETES MELLITUS WITHOUT COMPLICATION, WITHOUT LONG-TERM CURRENT USE OF INSULIN (MULTI): ICD-10-CM

## 2025-02-19 PROCEDURE — 97802 MEDICAL NUTRITION INDIV IN: CPT | Performed by: INTERNAL MEDICINE

## 2025-03-03 ENCOUNTER — APPOINTMENT (OUTPATIENT)
Dept: UROLOGY | Facility: CLINIC | Age: 70
End: 2025-03-03
Payer: MEDICARE

## 2025-03-11 ENCOUNTER — APPOINTMENT (OUTPATIENT)
Dept: PRIMARY CARE | Facility: CLINIC | Age: 70
End: 2025-03-11
Payer: COMMERCIAL

## 2025-03-11 VITALS
BODY MASS INDEX: 37.1 KG/M2 | HEART RATE: 83 BPM | TEMPERATURE: 97.6 F | OXYGEN SATURATION: 94 % | DIASTOLIC BLOOD PRESSURE: 80 MMHG | WEIGHT: 265 LBS | HEIGHT: 71 IN | SYSTOLIC BLOOD PRESSURE: 124 MMHG

## 2025-03-11 DIAGNOSIS — J45.909 UNSPECIFIED ASTHMA, UNCOMPLICATED (HHS-HCC): ICD-10-CM

## 2025-03-11 DIAGNOSIS — E66.812 CLASS 2 SEVERE OBESITY DUE TO EXCESS CALORIES WITH SERIOUS COMORBIDITY AND BODY MASS INDEX (BMI) OF 36.0 TO 36.9 IN ADULT: ICD-10-CM

## 2025-03-11 DIAGNOSIS — E66.01 CLASS 2 SEVERE OBESITY DUE TO EXCESS CALORIES WITH SERIOUS COMORBIDITY AND BODY MASS INDEX (BMI) OF 36.0 TO 36.9 IN ADULT: ICD-10-CM

## 2025-03-11 DIAGNOSIS — J44.1 COPD WITH ACUTE EXACERBATION (MULTI): ICD-10-CM

## 2025-03-11 DIAGNOSIS — I10 ESSENTIAL (PRIMARY) HYPERTENSION: ICD-10-CM

## 2025-03-11 DIAGNOSIS — E11.9 TYPE 2 DIABETES MELLITUS WITHOUT COMPLICATION, WITHOUT LONG-TERM CURRENT USE OF INSULIN (MULTI): Primary | ICD-10-CM

## 2025-03-11 DIAGNOSIS — J45.990 EXERCISE INDUCED BRONCHOSPASM (HHS-HCC): ICD-10-CM

## 2025-03-11 PROBLEM — R06.83 SNORING: Status: ACTIVE | Noted: 2025-03-11

## 2025-03-11 PROBLEM — Z97.3 WEARS EYEGLASSES: Status: ACTIVE | Noted: 2025-03-11

## 2025-03-11 PROBLEM — H25.819 COMBINED FORMS OF AGE-RELATED CATARACT: Status: ACTIVE | Noted: 2025-03-11

## 2025-03-11 PROBLEM — M25.569 KNEE PAIN: Status: ACTIVE | Noted: 2025-03-11

## 2025-03-11 PROCEDURE — 3079F DIAST BP 80-89 MM HG: CPT | Performed by: INTERNAL MEDICINE

## 2025-03-11 PROCEDURE — 1036F TOBACCO NON-USER: CPT | Performed by: INTERNAL MEDICINE

## 2025-03-11 PROCEDURE — 3074F SYST BP LT 130 MM HG: CPT | Performed by: INTERNAL MEDICINE

## 2025-03-11 PROCEDURE — 3008F BODY MASS INDEX DOCD: CPT | Performed by: INTERNAL MEDICINE

## 2025-03-11 PROCEDURE — G2211 COMPLEX E/M VISIT ADD ON: HCPCS | Performed by: INTERNAL MEDICINE

## 2025-03-11 PROCEDURE — 4010F ACE/ARB THERAPY RXD/TAKEN: CPT | Performed by: INTERNAL MEDICINE

## 2025-03-11 PROCEDURE — 99214 OFFICE O/P EST MOD 30 MIN: CPT | Performed by: INTERNAL MEDICINE

## 2025-03-11 PROCEDURE — 1158F ADVNC CARE PLAN TLK DOCD: CPT | Performed by: INTERNAL MEDICINE

## 2025-03-11 PROCEDURE — 1123F ACP DISCUSS/DSCN MKR DOCD: CPT | Performed by: INTERNAL MEDICINE

## 2025-03-11 PROCEDURE — 1159F MED LIST DOCD IN RCRD: CPT | Performed by: INTERNAL MEDICINE

## 2025-03-11 RX ORDER — TROSPIUM CHLORIDE ER 60 MG/1
1 CAPSULE ORAL
COMMUNITY
Start: 2024-06-11 | End: 2025-03-11 | Stop reason: ALTCHOICE

## 2025-03-11 RX ORDER — LOSARTAN POTASSIUM 100 MG/1
100 TABLET ORAL DAILY
Qty: 100 TABLET | Refills: 3 | Status: SHIPPED | OUTPATIENT
Start: 2025-03-11 | End: 2026-04-15

## 2025-03-11 RX ORDER — FLUTICASONE PROPIONATE AND SALMETEROL 250; 50 UG/1; UG/1
1 POWDER RESPIRATORY (INHALATION)
Qty: 60 EACH | Refills: 11 | Status: SHIPPED | OUTPATIENT
Start: 2025-03-11 | End: 2026-03-11

## 2025-03-11 RX ORDER — ALBUTEROL SULFATE 90 UG/1
2 INHALANT RESPIRATORY (INHALATION)
Qty: 8 G | Refills: 3 | Status: SHIPPED | OUTPATIENT
Start: 2025-03-11

## 2025-03-11 NOTE — ASSESSMENT & PLAN NOTE
Not tolerating metformin 4th tab.  Add Ozempic. Discussed Ozempic   Follow up 3 months  Continue diet and exercises  Orders:    semaglutide 0.25 mg or 0.5 mg (2 mg/3 mL) pen injector; Inject 0.25 mg under the skin 1 (one) time per week.

## 2025-03-11 NOTE — PROGRESS NOTES
"PCP: Brian Alves MD   I have reviewed existing histories, notes, past medical history, surgical history, social history, family history, med list, allergy list, and immunization, and updated.    HPI:   3 months Follow up.  After adding 4th tab of metformin has had some Gi Side effects.  Still feeling very tied  Not tolerating cpap for more than 3 hours per night  Has talked to intimations, adjusting diet  Goes to gyn to workout at least 30 minutes per day  No chest pain  Has asthma/copd, on Advair diskus low dose.  Using albuterol inhaler 3x per week.  No feet issues.  Up to date with vaccines, except for covid booster which he declined today    Lab Results   Component Value Date    WBC 7.2 12/04/2024    HGB 14.0 12/04/2024    HCT 42.5 12/04/2024     12/04/2024    CHOL 157 12/04/2024    TRIG 188 (H) 12/04/2024    HDL 40.9 12/04/2024    ALT 28 12/04/2024    AST 17 12/04/2024     12/04/2024    K 4.8 12/04/2024     12/04/2024    CREATININE 1.09 12/04/2024    BUN 21 12/04/2024    CO2 29 12/04/2024    TSH 1.05 09/30/2021    PSA 2.48 02/19/2020    HGBA1C 6.7 (H) 12/04/2024     Physical exam:  /80   Pulse 83   Temp 36.4 °C (97.6 °F)   Ht 1.803 m (5' 11\")   Wt 120 kg (265 lb)   SpO2 94%   BMI 36.96 kg/m²    Neck exam showed no mass, lymphadenopathy, or thyroid enlargement, and no carotid bruit.  Heart exam showed normal heart sounds, no murmur, clicks, gallops or rubs. Regular rate and rhythm. Chest is clear; no wheezes or rales.  No Abdominal pain  No leg edema.  Denied feet problems.    Assessments/orders:   Assessment & Plan  Type 2 diabetes mellitus without complication, without long-term current use of insulin (Multi)  Not tolerating metformin 4th tab.  Add Ozempic. Discussed Ozempic   Follow up 3 months  Continue diet and exercises  Orders:    semaglutide 0.25 mg or 0.5 mg (2 mg/3 mL) pen injector; Inject 0.25 mg under the skin 1 (one) time per week.    Exercise induced bronchospasm " ([facility])    Orders:    albuterol 90 mcg/actuation inhaler; Inhale 2 puffs 4 times a day.    Essential (primary) hypertension  Controlled. refills  Orders:    losartan (Cozaar) 100 mg tablet; Take 1 tablet (100 mg) by mouth once daily.    COPD with acute exacerbation (Multi)         Class 2 severe obesity due to excess calories with serious comorbidity and body mass index (BMI) of 36.0 to 36.9 in adult         Unspecified asthma, uncomplicated ([facility])  increase Advair diskus  Orders:    fluticasone propion-salmeteroL (Advair Diskus) 250-50 mcg/dose diskus inhaler; Inhale 1 puff 2 times a day. Rinse mouth with water after use to reduce aftertaste and incidence of candidiasis. Do not swallow.

## 2025-03-15 DIAGNOSIS — M19.041 PRIMARY OSTEOARTHRITIS OF BOTH HANDS: ICD-10-CM

## 2025-03-15 DIAGNOSIS — M19.042 PRIMARY OSTEOARTHRITIS OF BOTH HANDS: ICD-10-CM

## 2025-03-17 ENCOUNTER — APPOINTMENT (OUTPATIENT)
Dept: UROLOGY | Facility: CLINIC | Age: 70
End: 2025-03-17
Payer: MEDICARE

## 2025-03-17 VITALS — TEMPERATURE: 97.6 F

## 2025-03-17 DIAGNOSIS — N52.9 ERECTILE DYSFUNCTION, UNSPECIFIED ERECTILE DYSFUNCTION TYPE: ICD-10-CM

## 2025-03-17 DIAGNOSIS — N39.41 URGENCY INCONTINENCE: ICD-10-CM

## 2025-03-17 DIAGNOSIS — N48.6 PEYRONIE DISEASE: Primary | ICD-10-CM

## 2025-03-17 DIAGNOSIS — R32 URINARY INCONTINENCE, UNSPECIFIED TYPE: ICD-10-CM

## 2025-03-17 DIAGNOSIS — R32 UNSPECIFIED URINARY INCONTINENCE: ICD-10-CM

## 2025-03-17 LAB
POC APPEARANCE, URINE: CLEAR
POC BILIRUBIN, URINE: NEGATIVE
POC BLOOD, URINE: NEGATIVE
POC COLOR, URINE: YELLOW
POC GLUCOSE, URINE: NEGATIVE MG/DL
POC KETONES, URINE: NEGATIVE MG/DL
POC LEUKOCYTES, URINE: NEGATIVE
POC NITRITE,URINE: NEGATIVE
POC PH, URINE: 5.5 PH
POC PROTEIN, URINE: ABNORMAL MG/DL
POC SPECIFIC GRAVITY, URINE: 1.02
POC UROBILINOGEN, URINE: 0.2 EU/DL

## 2025-03-17 PROCEDURE — 4010F ACE/ARB THERAPY RXD/TAKEN: CPT | Performed by: STUDENT IN AN ORGANIZED HEALTH CARE EDUCATION/TRAINING PROGRAM

## 2025-03-17 PROCEDURE — 1159F MED LIST DOCD IN RCRD: CPT | Performed by: STUDENT IN AN ORGANIZED HEALTH CARE EDUCATION/TRAINING PROGRAM

## 2025-03-17 PROCEDURE — 99214 OFFICE O/P EST MOD 30 MIN: CPT | Performed by: STUDENT IN AN ORGANIZED HEALTH CARE EDUCATION/TRAINING PROGRAM

## 2025-03-17 PROCEDURE — 1160F RVW MEDS BY RX/DR IN RCRD: CPT | Performed by: STUDENT IN AN ORGANIZED HEALTH CARE EDUCATION/TRAINING PROGRAM

## 2025-03-17 PROCEDURE — 51798 US URINE CAPACITY MEASURE: CPT | Performed by: STUDENT IN AN ORGANIZED HEALTH CARE EDUCATION/TRAINING PROGRAM

## 2025-03-17 PROCEDURE — 1126F AMNT PAIN NOTED NONE PRSNT: CPT | Performed by: STUDENT IN AN ORGANIZED HEALTH CARE EDUCATION/TRAINING PROGRAM

## 2025-03-17 PROCEDURE — 1123F ACP DISCUSS/DSCN MKR DOCD: CPT | Performed by: STUDENT IN AN ORGANIZED HEALTH CARE EDUCATION/TRAINING PROGRAM

## 2025-03-17 PROCEDURE — 81003 URINALYSIS AUTO W/O SCOPE: CPT | Performed by: STUDENT IN AN ORGANIZED HEALTH CARE EDUCATION/TRAINING PROGRAM

## 2025-03-17 ASSESSMENT — PAIN SCALES - GENERAL: PAINLEVEL_OUTOF10: 0-NO PAIN

## 2025-03-17 NOTE — PROGRESS NOTES
Scribed for Dr. Benny Berry by Kraig Inman. I, Dr. Benny Berry have personally reviewed and agreed with the   Scribed for Dr. Benny Berry by Leanne Worthington . I, Dr. Benny Berry have personally reviewed and agreed with the information entered by the Virtual Scribe. 03/17/25.    ASSESSMENT:  Problem List Items Addressed This Visit       ED (erectile dysfunction)    Relevant Medications    tadalafil (Cialis) 5 mg tablet    Peyronie disease - Primary    Urinary incontinence    Relevant Orders    POCT UA Automated manually resulted (Completed)    Measure post void residual (Completed)    Urgency incontinence    Relevant Medications    tadalafil (Cialis) 5 mg tablet     Other Visit Diagnoses       Unspecified urinary incontinence        Relevant Medications    tamsulosin (Flomax) 0.4 mg 24 hr capsule    Other Relevant Orders    POCT UA Automated manually resulted (Completed)    Measure post void residual (Completed)          BPH with LUTS  OAB symptoms  Urinary incontinence  Erectile dysfunction  Peyronie's disease    PLAN:  #OAB  #UUI  Denotes side effects and worsening incontinence with Trospium for his OAB/UUI.   Discussed  alternative treatment options including trial of Myrbetriq vs referral for [facility] clinical trial  Patient elects to stay off medication for now and continue active surveillance.   Will reevaluate plan on an annual basis or earlier should the patient needs rx.     #BPH  #Erectile dysfunction  Denotes benefit with continuing Tadalafil 5 mg daily for his BPH/ED.   Tolerating the medication without side-effects.   Discussed risks of continued use and alternative treatment options.   Patient elects to continue Tadalafil, Rx refill sent to pharmacy.   Will reevaluate plan on an annual basis.     #Prostate cancer screening  Continue annual PSA screening.     PSA summary:  2.72 ~ Dec 2024  2.91 ~ Nov 2023  3.85 ~ Dec 2022    All questions were answered to the patient’s  "satisfaction.  Patient agrees with the plan and wishes to proceed.  Continue follow-up for ongoing care of his chronic medical conditions.       History of Present Illness (HPI):  Hipolito (\"Mike\") presents for a follow up visit.  The patient’s EMR has been reviewed.  Lives in [place], [place].     History of BPH with LUTS, OAB, ED and PD.  Previously followed by Jolynn Barrett.    Continues on daily tadalafil for his BPH/ED with benefit.   Started on Trospium for his OAB symptoms (08/01/23).  Denoted benefit, with minimal side-effects. (As of 09/18/23)  Opted to continue medical management.     TODAY: (03/17/25)  Presents for a follow up visit.   Reports that she has stopped taking Trospium due to side effects and made his sx worse.   Patient was recently diagnosed with Diabetes and was stared on Ozempic.     PSA: 2.7 (Dec 2024) ~ wnl  Testosterone: 306 (Dec 2024) ~ wnl  FSH/LH within normal limits (Dec 2024)  HbA1c 6.7    TO REVIEW: Last visit (09/18/23)  Reports a great response to Trospium.  States his urgency is no longer bothersome.   Reports some eye dryness with the medication.  However, alleviated with using eye drops.  Wishes to continue tadalafil and trospium.      Regarding his PD.  He has been using a YOUNG with some improvement.  Reports the degree of curvature is not severe.  His PD is not painful and does not interfere with his ability to perform intercourse.     TO REVIEW: (08/01/23)  Presents for a follow up evaluation.   Started on tadalafil last visit for treatment of his LUTS and ED.  He acknowledges improvement of his urinary stream, urgency and leakage since starting.   However, his urgency and leakage have not completely resolved and is still bothersome.   Reports his erections have become more reliable and last longer since starting the medication.  He denies any side-effects from the medication and wishes to continue.      Uroflow results:  normal bell-curve  Qmax: 10 mL/s,   VV: 85 mL  PVR: 18 mL   "   TO REVIEW: Initial visit (06/30/23)  Patient is new to me.  Presents today with a new complaint of worsening urgency with leakage.   States that he developed severe daily urinary urgency and frequency about 1 month ago.  This has been gradually worsening in severity.  Unable to make it to the restroom at times and leaks.   His typical BPH related LUTS is having a weak stream occasionally with some frequency.   Denies any side-effects with these medications.  FHx of bladder cancer (Father, whom was not a smoker).     Hx of Peyronie's disease.  Onset was around 2018.   States that this is not overly bothersome.   Otherwise denies any recent UTIs.  Denies dysuria, gross hematuria, flank pain, fevers or chills.     PMH: BPH, ED, PD, OAB, corneal abrasion  PSH: Denies  FH: Denies FH of  malignancy.   SH: Non-smoker.     Past Medical History:   Diagnosis Date    Personal history of other (healed) physical injury and trauma     History of corneal abrasion     Past Surgical History:   Procedure Laterality Date    OTHER SURGICAL HISTORY  01/11/2019    No history of surgery     Family History   Problem Relation Name Age of Onset    Diabetes Other GRANDMOTHER      Social History     Tobacco Use   Smoking Status Never   Smokeless Tobacco Never     Current Outpatient Medications   Medication Sig Dispense Refill    albuterol 90 mcg/actuation inhaler Inhale 2 puffs 4 times a day. 8 g 3    azelastine (Astelin) 137 mcg (0.1 %) nasal spray PLEASE SEE ATTACHED FOR DETAILED DIRECTIONS 90 mL 0    fluticasone propion-salmeteroL (Advair Diskus) 250-50 mcg/dose diskus inhaler Inhale 1 puff 2 times a day. Rinse mouth with water after use to reduce aftertaste and incidence of candidiasis. Do not swallow. 60 each 11    losartan (Cozaar) 100 mg tablet Take 1 tablet (100 mg) by mouth once daily. 100 tablet 3    metFORMIN  mg 24 hr tablet TAKE 4 TABLETS BY MOUTH ONCE PER  tablet 1    montelukast (Singulair) 10 mg tablet TAKE 1  TABLET BY MOUTH EVERY DAY AS DIRECTED 90 tablet 1    omeprazole OTC (PriLOSEC OTC) 20 mg EC tablet Take 1 tablet (20 mg) by mouth. Do not crush, chew, or split.      rosuvastatin (Crestor) 10 mg tablet TAKE 1 TABLET BY MOUTH EVERYDAY AT BEDTIME 90 tablet 3    semaglutide 0.25 mg or 0.5 mg (2 mg/3 mL) pen injector Inject 0.25 mg under the skin 1 (one) time per week. 3 mL 0    tadalafil (Cialis) 5 mg tablet TAKE ONE TABLET BY MOUTH ONCE DAILY 90 tablet 3    tamsulosin (Flomax) 0.4 mg 24 hr capsule TAKE 1 CAPSULE BY MOUTH EVERYDAY AT BEDTIME 90 capsule 3    triamcinolone (Nasacort) 55 mcg nasal inhaler ADMINISTER 2 SPRAYS INTO EACH NOSTRIL ONCE DAILY. 17 mL 2     Current Facility-Administered Medications   Medication Dose Route Frequency Provider Last Rate Last Admin    lidocaine (Xylocaine) 10 mg/mL (1 %) injection 0.5 mL  0.5 mL infiltration Once Tk Urbano MD        triamcinolone acetonide (Kenalog-40) injection 20 mg  20 mg intra-articular Once Tk Urbano MD         No Known Allergies  Past medical, surgical, family and social history in the chart was reviewed and is accurate including any additions to what is in this HPI.    REVIEW OF SYSTEMS (ROS):   Constitutional: denies any unintentional weight loss or change in strength.  Integumentary: denies any rashes or pruritus.  Eyes: denies any double vision or eye pain.  Ear/Nose/Mouth/Throat: denies any nosebleeds or gum bleeds.  Cardiovascular: denies any chest pain or syncope.  Respiratory: denies hemoptysis.  Gastrointestinal: denies nausea or vomiting.  Musculoskeletal: denies muscle cramping or weakness.  Neurologic: denies convulsions or seizures.  Hematologic/Lymphatic: denies bleeding tendencies.  Endocrine: denies heat/cold intolerance.  All other systems have been reviewed and are negative unless otherwise noted in the HPI.     OBJECTIVE:  Visit Vitals  Temp 36.4 °C (97.6 °F)     PHYSICAL EXAM:  Constitutional: No obvious distress.  Eyes: Non-injected  conjunctiva, sclera clear, EOMI.  Ears/Nose/Mouth/Throat: No obvious drainage per ears or nose.  Cardiovascular: Extremities are warm and well perfused. No edema, cyanosis or pallor.  Respiratory: No audible wheezing/stridor; respirations do not appear labored.  Gastrointestinal: Abdomen soft, not distended.  Musculoskeletal: Normal ROM of extremities.  Skin: No obvious rashes or open sores.  Neurologic: Alert and oriented, CN 2-12 grossly intact.  Psychiatric: Answers questions appropriately with normal affect.  Hematologic/Lymphatic/Immunologic: No obvious bruises or sites of spontaneous bleeding.  Genitourinary: No CVA tenderness, bladder not palpable.     LABS & IMAGING:  Basic Labs:  Lab Results   Component Value Date    WBC 7.2 12/04/2024    HGB 14.0 12/04/2024    HCT 42.5 12/04/2024     12/04/2024     12/04/2024    K 4.8 12/04/2024     12/04/2024    ALT 28 12/04/2024    AST 17 12/04/2024    CREATININE 1.09 12/04/2024    BUN 21 12/04/2024    CO2 29 12/04/2024    TSH 1.05 09/30/2021       Scribed for Dr. Benny Berry by Kraig Inman.  By signing my name below, I, Leanne Worthington Scribe attest that this documentation has been prepared under the direction and in the presence of Benny Berry MD. All medical record entries made by the Scribe were at my direction or personally dictated by me. I have reviewed the chart and agree that the record accurately reflects my personal performance of the history, physical exam, discussion and plan.

## 2025-03-18 RX ORDER — TAMSULOSIN HYDROCHLORIDE 0.4 MG/1
0.4 CAPSULE ORAL NIGHTLY
Qty: 90 CAPSULE | Refills: 3 | Status: SHIPPED | OUTPATIENT
Start: 2025-03-18

## 2025-03-18 RX ORDER — TADALAFIL 5 MG/1
5 TABLET ORAL DAILY
Qty: 90 TABLET | Refills: 3 | Status: SHIPPED | OUTPATIENT
Start: 2025-03-18

## 2025-03-20 RX ORDER — MELOXICAM 15 MG/1
15 TABLET ORAL
Qty: 30 TABLET | Refills: 0 | Status: SHIPPED | OUTPATIENT
Start: 2025-03-20 | End: 2025-04-19

## 2025-04-06 DIAGNOSIS — E11.9 TYPE 2 DIABETES MELLITUS WITHOUT COMPLICATION, WITHOUT LONG-TERM CURRENT USE OF INSULIN: ICD-10-CM

## 2025-04-07 RX ORDER — METFORMIN HYDROCHLORIDE 500 MG/1
TABLET, EXTENDED RELEASE ORAL
Qty: 270 TABLET | Refills: 0 | Status: SHIPPED | OUTPATIENT
Start: 2025-04-07

## 2025-04-14 ENCOUNTER — TELEPHONE (OUTPATIENT)
Dept: SLEEP MEDICINE | Facility: CLINIC | Age: 70
End: 2025-04-14
Payer: MEDICARE

## 2025-04-14 DIAGNOSIS — G47.33 OSA (OBSTRUCTIVE SLEEP APNEA): Primary | ICD-10-CM

## 2025-04-14 NOTE — TELEPHONE ENCOUNTER
Patient called in his insurance changed over and now he has to return his current cpap machine. Patient needs a new order placed for a machine and would like you to give him a call.

## 2025-04-15 ENCOUNTER — TELEPHONE (OUTPATIENT)
Dept: PRIMARY CARE | Facility: CLINIC | Age: 70
End: 2025-04-15
Payer: MEDICARE

## 2025-04-15 DIAGNOSIS — E11.9 TYPE 2 DIABETES MELLITUS WITHOUT COMPLICATION, WITHOUT LONG-TERM CURRENT USE OF INSULIN: ICD-10-CM

## 2025-04-15 NOTE — TELEPHONE ENCOUNTER
PT CALLED FOR A REFILL ON [facility] HE STATED THAT HE IS CURRENTLY TAKING .25 AND HER RECEIVED 6 SYRINGE

## 2025-04-17 NOTE — TELEPHONE ENCOUNTER
Called pt and he said his current DME is [facility]. He switched insurance to [employer] and DME is not accepting [place] [employer]. Will place PAP to [facility]. Pls let [facility] know and move his schedule 2 months out.

## 2025-04-20 DIAGNOSIS — M19.041 PRIMARY OSTEOARTHRITIS OF BOTH HANDS: ICD-10-CM

## 2025-04-20 DIAGNOSIS — M19.042 PRIMARY OSTEOARTHRITIS OF BOTH HANDS: ICD-10-CM

## 2025-04-21 NOTE — TELEPHONE ENCOUNTER
Prescription Request        Has The Patient Been Identified By Name And Date Of Birth: Yes    RX Requestor: Pharmacy    Date of Last Refill: 03/20/2025    Date Of Last Office Visit: 01/10/2025    Date Of Future Office Visit: 05/30/2025 with you

## 2025-04-22 RX ORDER — MELOXICAM 15 MG/1
15 TABLET ORAL
Qty: 30 TABLET | Refills: 0 | Status: SHIPPED | OUTPATIENT
Start: 2025-04-22 | End: 2025-05-22

## 2025-05-08 ENCOUNTER — APPOINTMENT (OUTPATIENT)
Dept: SLEEP MEDICINE | Facility: CLINIC | Age: 70
End: 2025-05-08
Payer: MEDICARE

## 2025-05-18 DIAGNOSIS — M19.042 PRIMARY OSTEOARTHRITIS OF BOTH HANDS: ICD-10-CM

## 2025-05-18 DIAGNOSIS — M19.041 PRIMARY OSTEOARTHRITIS OF BOTH HANDS: ICD-10-CM

## 2025-05-19 RX ORDER — MELOXICAM 15 MG/1
15 TABLET ORAL
Qty: 30 TABLET | Refills: 0 | Status: SHIPPED | OUTPATIENT
Start: 2025-05-19 | End: 2025-06-18

## 2025-05-19 NOTE — TELEPHONE ENCOUNTER
Prescription Request        Has The Patient Been Identified By Name And Date Of Birth: Eunice    RX Requestor: Pharmacy    Date of Last Refill: 04/22/2025    Date Of Last Office Visit: 01/10/2025    Date Of Future Office Visit:  Scheduled with you 05/30

## 2025-05-28 DIAGNOSIS — E11.9 TYPE 2 DIABETES MELLITUS WITHOUT COMPLICATION, WITHOUT LONG-TERM CURRENT USE OF INSULIN: ICD-10-CM

## 2025-05-30 ENCOUNTER — APPOINTMENT (OUTPATIENT)
Dept: RHEUMATOLOGY | Facility: CLINIC | Age: 70
End: 2025-05-30
Payer: MEDICARE

## 2025-05-30 VITALS
WEIGHT: 253 LBS | HEART RATE: 76 BPM | TEMPERATURE: 98.6 F | HEIGHT: 71 IN | DIASTOLIC BLOOD PRESSURE: 95 MMHG | BODY MASS INDEX: 35.42 KG/M2 | OXYGEN SATURATION: 94 % | SYSTOLIC BLOOD PRESSURE: 151 MMHG

## 2025-05-30 DIAGNOSIS — M19.031 CMC DJD(CARPOMETACARPAL DEGENERATIVE JOINT DISEASE), LOCALIZED PRIMARY, RIGHT: ICD-10-CM

## 2025-05-30 DIAGNOSIS — M19.90 OSTEOARTHRITIS, UNSPECIFIED OSTEOARTHRITIS TYPE, UNSPECIFIED SITE: Primary | ICD-10-CM

## 2025-05-30 RX ORDER — LIDOCAINE HYDROCHLORIDE 10 MG/ML
0.5 INJECTION, SOLUTION INFILTRATION; PERINEURAL
Status: COMPLETED | OUTPATIENT
Start: 2025-05-30 | End: 2025-05-30

## 2025-05-30 RX ADMIN — TRIAMCINOLONE ACETONIDE 20 MG: 40 INJECTION, SUSPENSION INTRA-ARTICULAR; INTRAMUSCULAR at 09:20

## 2025-05-30 RX ADMIN — LIDOCAINE HYDROCHLORIDE 0.5 ML: 10 INJECTION, SOLUTION INFILTRATION; PERINEURAL at 09:20

## 2025-05-30 ASSESSMENT — PAIN SCALES - GENERAL: PAINLEVEL_OUTOF10: 5

## 2025-05-30 NOTE — PROGRESS NOTES
"Subjective . Hipolito Asencio \"Mike\" is a 69 y.o. male who presents for Establish Care.    HPI. 69-year-old male with history of OA, bilateral CTS, exercise-induced asthma, HTN, dyslipidemia and obesity presented for follow-up.     He reports pain in both thumbs.  Right thumb hurts more than the left.  Thumb pain gets worse with certain physical activities like lifting weights and performing ADLs.  Denies trauma to the thumb.  He has not noticed swelling, numbness and tingling of the thumbs.      05/25:  He reports right thumb pain  He received steroid injection in 01/25 and worked for a while.  No other joint problem      Review of Systems   All other systems reviewed and are negative.    Physical Exam.  Gen. AAO x3, NAD.  HEENT: No pallor or icterus, PERRLA, EOMI. No cervical lymphadenopathy .  Skin: No rashes.  Heart: S1, S2/ RRR.   Lungs: CTA B.  Abdomen: Soft, NT/ND, BS regular.  MSK: Bilateral CMC squaring.  Right thumb with mild tenderness and positive grinding test.    Neuro: Sensation to touch intact.Strength 5/5 throughout.   Psych:Appropriate mood and behavior  EXT: No edema    Assessment/Plan .    #1: Bilateral CMC osteoarthritis.  -Kenalog 20 mg mixed with 0.5 mL of 1% lidocaine injected to the right CMC joint.  -Discussed to take Tylenol as needed.  -recommended him to use a splint    -will see him in 6 months      Patient ID: Hipolito Asencio \"Mike\" is a 69 y.o. male.    Small Joint Injection/Arthrocentesis: R thumb CMC on 5/30/2025 9:20 AM  Indications: pain  Details: 25 G needle, dorsal approach  Medications: 20 mg triamcinolone acetonide (Kenalog-40) injection 40 mg/mL; 0.5 mL lidocaine 10 mg/mL (1 %)  Consent was given by the patient. Patient was prepped and draped in the usual sterile fashion.           "

## 2025-06-11 ENCOUNTER — APPOINTMENT (OUTPATIENT)
Dept: PRIMARY CARE | Facility: CLINIC | Age: 70
End: 2025-06-11
Payer: MEDICARE

## 2025-06-11 VITALS
SYSTOLIC BLOOD PRESSURE: 142 MMHG | BODY MASS INDEX: 34.58 KG/M2 | WEIGHT: 247 LBS | OXYGEN SATURATION: 95 % | DIASTOLIC BLOOD PRESSURE: 77 MMHG | HEART RATE: 83 BPM | TEMPERATURE: 98.3 F | HEIGHT: 71 IN

## 2025-06-11 DIAGNOSIS — E66.811 CLASS 1 OBESITY DUE TO EXCESS CALORIES WITH SERIOUS COMORBIDITY AND BODY MASS INDEX (BMI) OF 34.0 TO 34.9 IN ADULT: ICD-10-CM

## 2025-06-11 DIAGNOSIS — E11.9 TYPE 2 DIABETES MELLITUS WITHOUT COMPLICATION, WITHOUT LONG-TERM CURRENT USE OF INSULIN: Primary | ICD-10-CM

## 2025-06-11 DIAGNOSIS — J32.9 RECURRENT SINUSITIS: ICD-10-CM

## 2025-06-11 DIAGNOSIS — E66.09 CLASS 1 OBESITY DUE TO EXCESS CALORIES WITH SERIOUS COMORBIDITY AND BODY MASS INDEX (BMI) OF 34.0 TO 34.9 IN ADULT: ICD-10-CM

## 2025-06-11 PROCEDURE — 1036F TOBACCO NON-USER: CPT | Performed by: INTERNAL MEDICINE

## 2025-06-11 PROCEDURE — 4010F ACE/ARB THERAPY RXD/TAKEN: CPT | Performed by: INTERNAL MEDICINE

## 2025-06-11 PROCEDURE — 3078F DIAST BP <80 MM HG: CPT | Performed by: INTERNAL MEDICINE

## 2025-06-11 PROCEDURE — 99213 OFFICE O/P EST LOW 20 MIN: CPT | Performed by: INTERNAL MEDICINE

## 2025-06-11 PROCEDURE — 1159F MED LIST DOCD IN RCRD: CPT | Performed by: INTERNAL MEDICINE

## 2025-06-11 PROCEDURE — 3008F BODY MASS INDEX DOCD: CPT | Performed by: INTERNAL MEDICINE

## 2025-06-11 PROCEDURE — 3077F SYST BP >= 140 MM HG: CPT | Performed by: INTERNAL MEDICINE

## 2025-06-11 NOTE — PROGRESS NOTES
"PCP: Brian Alves MD   I have reviewed existing histories, notes, past medical history, surgical history, social history, family history, med list, allergy list, and immunization, and updated.    HPI:   Ozempic was started 3 months ago, currently on 1mg per week. Has lost 18 lbs. Mild nausea, which has resolved. Feels well.  Has got a new cpap machine, trying to adjust to it.     Lab Results   Component Value Date    WBC 7.2 12/04/2024    HGB 14.0 12/04/2024    HCT 42.5 12/04/2024     12/04/2024    CHOL 157 12/04/2024    TRIG 188 (H) 12/04/2024    HDL 40.9 12/04/2024    ALT 28 12/04/2024    AST 17 12/04/2024     12/04/2024    K 4.8 12/04/2024     12/04/2024    CREATININE 1.09 12/04/2024    BUN 21 12/04/2024    CO2 29 12/04/2024    TSH 1.05 09/30/2021    PSA 2.48 02/19/2020    HGBA1C 6.7 (H) 12/04/2024     Physical exam:  /77   Pulse 83   Temp 36.8 °C (98.3 °F)   Ht 1.803 m (5' 11\")   Wt 112 kg (247 lb)   SpO2 95%   BMI 34.45 kg/m²     Heart RR  Lungs clear  No carotid bruit  No leg edema.    Assessments/orders:   Assessment & Plan  Type 2 diabetes mellitus without complication, without long-term current use of insulin    Orders:    Albumin-Creatinine Ratio, Urine Random; Future    Hemoglobin A1C; Future    Basic Metabolic Panel; Future    Class 1 obesity due to excess calories with serious comorbidity and body mass index (BMI) of 34.0 to 34.9 in adult            Continue Ozempic. Next rx will be 2mg dose. Keep the Follow up in 3 months               "

## 2025-06-11 NOTE — ASSESSMENT & PLAN NOTE
Orders:    Albumin-Creatinine Ratio, Urine Random; Future    Hemoglobin A1C; Future    Basic Metabolic Panel; Future

## 2025-06-12 DIAGNOSIS — J45.990 EXERCISE INDUCED BRONCHOSPASM (HHS-HCC): ICD-10-CM

## 2025-06-12 LAB
ALBUMIN/CREAT UR: 5 MG/G CREAT
ANION GAP SERPL CALCULATED.4IONS-SCNC: 12 MMOL/L (CALC) (ref 7–17)
BUN SERPL-MCNC: 24 MG/DL (ref 7–25)
BUN/CREAT SERPL: NORMAL (CALC) (ref 6–22)
CALCIUM SERPL-MCNC: 8.9 MG/DL (ref 8.6–10.3)
CHLORIDE SERPL-SCNC: 105 MMOL/L (ref 98–110)
CO2 SERPL-SCNC: 22 MMOL/L (ref 20–32)
CREAT SERPL-MCNC: 1.01 MG/DL (ref 0.7–1.35)
CREAT UR-MCNC: 256 MG/DL (ref 20–320)
EGFRCR SERPLBLD CKD-EPI 2021: 82 ML/MIN/1.73M2
EST. AVERAGE GLUCOSE BLD GHB EST-MCNC: 137 MG/DL
EST. AVERAGE GLUCOSE BLD GHB EST-SCNC: 7.6 MMOL/L
GLUCOSE SERPL-MCNC: 93 MG/DL (ref 65–99)
HBA1C MFR BLD: 6.4 %
MICROALBUMIN UR-MCNC: 1.4 MG/DL
POTASSIUM SERPL-SCNC: 4.5 MMOL/L (ref 3.5–5.3)
SODIUM SERPL-SCNC: 139 MMOL/L (ref 135–146)

## 2025-06-13 RX ORDER — TRIAMCINOLONE ACETONIDE 55 UG/1
2 SPRAY, METERED NASAL DAILY
Qty: 17 ML | Refills: 3 | Status: SHIPPED | OUTPATIENT
Start: 2025-06-13

## 2025-06-19 RX ORDER — MONTELUKAST SODIUM 10 MG/1
10 TABLET ORAL DAILY
Qty: 14 TABLET | Refills: 0 | Status: SHIPPED | OUTPATIENT
Start: 2025-06-19

## 2025-06-22 DIAGNOSIS — M19.042 PRIMARY OSTEOARTHRITIS OF BOTH HANDS: ICD-10-CM

## 2025-06-22 DIAGNOSIS — M19.041 PRIMARY OSTEOARTHRITIS OF BOTH HANDS: ICD-10-CM

## 2025-06-23 RX ORDER — MELOXICAM 15 MG/1
15 TABLET ORAL
Qty: 30 TABLET | Refills: 0 | Status: SHIPPED | OUTPATIENT
Start: 2025-06-23 | End: 2025-07-23

## 2025-06-23 NOTE — TELEPHONE ENCOUNTER
Prescription Request        Has The Patient Been Identified By Name And Date Of Birth: Yes    RX Requestor: Pharmacy    Date of Last Refill: 05/19/2025    Date Of Last Office Visit: 05/30/2025    Date Of Future Office Visit: Scheduled with you on

## 2025-07-10 ENCOUNTER — PATIENT MESSAGE (OUTPATIENT)
Dept: PRIMARY CARE | Facility: CLINIC | Age: 70
End: 2025-07-10
Payer: MEDICARE

## 2025-07-10 DIAGNOSIS — E11.9 TYPE 2 DIABETES MELLITUS WITHOUT COMPLICATION, WITHOUT LONG-TERM CURRENT USE OF INSULIN: ICD-10-CM

## 2025-07-22 DIAGNOSIS — M19.042 PRIMARY OSTEOARTHRITIS OF BOTH HANDS: ICD-10-CM

## 2025-07-22 DIAGNOSIS — M19.041 PRIMARY OSTEOARTHRITIS OF BOTH HANDS: ICD-10-CM

## 2025-07-22 RX ORDER — MELOXICAM 15 MG/1
15 TABLET ORAL
Qty: 30 TABLET | Refills: 0 | Status: SHIPPED | OUTPATIENT
Start: 2025-07-22 | End: 2025-08-21

## 2025-08-14 ENCOUNTER — OFFICE VISIT (OUTPATIENT)
Dept: SLEEP MEDICINE | Facility: CLINIC | Age: 70
End: 2025-08-14
Payer: MEDICARE

## 2025-08-14 VITALS
HEIGHT: 71 IN | BODY MASS INDEX: 33.74 KG/M2 | DIASTOLIC BLOOD PRESSURE: 75 MMHG | WEIGHT: 241 LBS | SYSTOLIC BLOOD PRESSURE: 120 MMHG

## 2025-08-14 DIAGNOSIS — G47.33 OSA (OBSTRUCTIVE SLEEP APNEA): Primary | ICD-10-CM

## 2025-08-14 DIAGNOSIS — Z78.9 INTOLERANCE OF CONTINUOUS POSITIVE AIRWAY PRESSURE (CPAP) VENTILATION: ICD-10-CM

## 2025-08-14 DIAGNOSIS — G47.52 DREAM ENACTMENT BEHAVIOR: ICD-10-CM

## 2025-08-14 PROCEDURE — 3078F DIAST BP <80 MM HG: CPT | Performed by: INTERNAL MEDICINE

## 2025-08-14 PROCEDURE — 3008F BODY MASS INDEX DOCD: CPT | Performed by: INTERNAL MEDICINE

## 2025-08-14 PROCEDURE — 3074F SYST BP LT 130 MM HG: CPT | Performed by: INTERNAL MEDICINE

## 2025-08-14 PROCEDURE — 4010F ACE/ARB THERAPY RXD/TAKEN: CPT | Performed by: INTERNAL MEDICINE

## 2025-08-14 PROCEDURE — 99214 OFFICE O/P EST MOD 30 MIN: CPT | Mod: GT,95 | Performed by: INTERNAL MEDICINE

## 2025-08-14 PROCEDURE — 1160F RVW MEDS BY RX/DR IN RCRD: CPT | Performed by: INTERNAL MEDICINE

## 2025-08-14 PROCEDURE — 99214 OFFICE O/P EST MOD 30 MIN: CPT | Performed by: INTERNAL MEDICINE

## 2025-08-14 PROCEDURE — 1159F MED LIST DOCD IN RCRD: CPT | Performed by: INTERNAL MEDICINE

## 2025-08-14 ASSESSMENT — SLEEP AND FATIGUE QUESTIONNAIRES
SITING INACTIVE IN A PUBLIC PLACE LIKE A CLASS ROOM OR A MOVIE THEATER: WOULD NEVER DOZE
HOW LIKELY ARE YOU TO NOD OFF OR FALL ASLEEP WHILE SITTING QUIETLY AFTER LUNCH WITHOUT ALCOHOL: MODERATE CHANCE OF DOZING
HOW LIKELY ARE YOU TO NOD OFF OR FALL ASLEEP WHILE SITTING AND TALKING TO SOMEONE: WOULD NEVER DOZE
HOW LIKELY ARE YOU TO NOD OFF OR FALL ASLEEP WHILE SITTING AND READING: SLIGHT CHANCE OF DOZING
HOW LIKELY ARE YOU TO NOD OFF OR FALL ASLEEP WHILE SITTING AND READING: SLIGHT CHANCE OF DOZING
HOW LIKELY ARE YOU TO NOD OFF OR FALL ASLEEP WHILE SITTING AND TALKING TO SOMEONE: WOULD NEVER DOZE
HOW LIKELY ARE YOU TO NOD OFF OR FALL ASLEEP IN A CAR, WHILE STOPPED FOR A FEW MINUTES IN TRAFFIC: WOULD NEVER DOZE
HOW LIKELY ARE YOU TO NOD OFF OR FALL ASLEEP IN A CAR, WHILE STOPPED FOR A FEW MINUTES IN TRAFFIC: WOULD NEVER DOZE
HOW LIKELY ARE YOU TO NOD OFF OR FALL ASLEEP WHEN YOU ARE A PASSENGER IN A CAR FOR AN HOUR WITHOUT A BREAK: WOULD NEVER DOZE
HOW LIKELY ARE YOU TO NOD OFF OR FALL ASLEEP WHILE SITTING QUIETLY AFTER LUNCH WITHOUT ALCOHOL: MODERATE CHANCE OF DOZING
HOW LIKELY ARE YOU TO NOD OFF OR FALL ASLEEP WHEN YOU ARE A PASSENGER IN A CAR FOR AN HOUR WITHOUT A BREAK: WOULD NEVER DOZE
HOW LIKELY ARE YOU TO NOD OFF OR FALL ASLEEP WHILE LYING DOWN TO REST IN THE AFTERNOON WHEN CIRCUMSTANCES PERMIT: MODERATE CHANCE OF DOZING
ESS TOTAL SCORE: 6
ESS-CHAD TOTAL SCORE: 6
HOW LIKELY ARE YOU TO NOD OFF OR FALL ASLEEP WHILE WATCHING TV: SLIGHT CHANCE OF DOZING
HOW LIKELY ARE YOU TO NOD OFF OR FALL ASLEEP WHILE LYING DOWN TO REST IN THE AFTERNOON WHEN CIRCUMSTANCES PERMIT: MODERATE CHANCE OF DOZING
HOW LIKELY ARE YOU TO NOD OFF OR FALL ASLEEP WHILE WATCHING TV: SLIGHT CHANCE OF DOZING
HOW LIKELY ARE YOU TO NOD OFF OR FALL ASLEEP WHILE SITTING INACTIVE IN A PUBLIC PLACE: WOULD NEVER DOZE

## 2025-08-16 DIAGNOSIS — M19.041 PRIMARY OSTEOARTHRITIS OF BOTH HANDS: ICD-10-CM

## 2025-08-16 DIAGNOSIS — M19.042 PRIMARY OSTEOARTHRITIS OF BOTH HANDS: ICD-10-CM

## 2025-08-18 RX ORDER — MELOXICAM 15 MG/1
15 TABLET ORAL
Qty: 30 TABLET | Refills: 0 | Status: SHIPPED | OUTPATIENT
Start: 2025-08-18 | End: 2025-09-17

## 2025-08-19 ENCOUNTER — OFFICE VISIT (OUTPATIENT)
Dept: PRIMARY CARE | Facility: CLINIC | Age: 70
End: 2025-08-19
Payer: MEDICARE

## 2025-08-19 VITALS
HEART RATE: 86 BPM | TEMPERATURE: 98.5 F | DIASTOLIC BLOOD PRESSURE: 73 MMHG | WEIGHT: 243 LBS | SYSTOLIC BLOOD PRESSURE: 129 MMHG | BODY MASS INDEX: 34.02 KG/M2 | HEIGHT: 71 IN | OXYGEN SATURATION: 96 %

## 2025-08-19 DIAGNOSIS — E11.9 TYPE 2 DIABETES MELLITUS WITHOUT COMPLICATION, WITHOUT LONG-TERM CURRENT USE OF INSULIN: ICD-10-CM

## 2025-08-19 DIAGNOSIS — L98.9 SKIN LESION: Primary | ICD-10-CM

## 2025-08-19 DIAGNOSIS — I10 ESSENTIAL (PRIMARY) HYPERTENSION: ICD-10-CM

## 2025-08-19 PROBLEM — K64.9 HEMORRHOID: Status: RESOLVED | Noted: 2023-01-24 | Resolved: 2025-08-19

## 2025-08-19 PROBLEM — E56.9 VITAMIN DEFICIENCY: Status: RESOLVED | Noted: 2023-01-24 | Resolved: 2025-08-19

## 2025-08-19 PROBLEM — R10.9 ABDOMINAL PAIN: Status: RESOLVED | Noted: 2023-01-24 | Resolved: 2025-08-19

## 2025-08-19 PROBLEM — Z97.3 WEARS EYEGLASSES: Status: RESOLVED | Noted: 2025-03-11 | Resolved: 2025-08-19

## 2025-08-19 PROBLEM — R07.9 CHEST PAIN: Status: RESOLVED | Noted: 2023-01-24 | Resolved: 2025-08-19

## 2025-08-19 PROBLEM — M25.521 RIGHT ELBOW PAIN: Status: RESOLVED | Noted: 2023-01-24 | Resolved: 2025-08-19

## 2025-08-19 PROBLEM — J20.9 ACUTE BRONCHITIS: Status: RESOLVED | Noted: 2023-01-24 | Resolved: 2025-08-19

## 2025-08-19 PROBLEM — H53.8 FLASHING LIGHTS SEEN: Status: RESOLVED | Noted: 2023-01-24 | Resolved: 2025-08-19

## 2025-08-19 PROCEDURE — 99213 OFFICE O/P EST LOW 20 MIN: CPT | Performed by: INTERNAL MEDICINE

## 2025-08-19 PROCEDURE — G2211 COMPLEX E/M VISIT ADD ON: HCPCS | Performed by: INTERNAL MEDICINE

## 2025-08-19 PROCEDURE — 4010F ACE/ARB THERAPY RXD/TAKEN: CPT | Performed by: INTERNAL MEDICINE

## 2025-08-19 PROCEDURE — 3078F DIAST BP <80 MM HG: CPT | Performed by: INTERNAL MEDICINE

## 2025-08-19 PROCEDURE — 1159F MED LIST DOCD IN RCRD: CPT | Performed by: INTERNAL MEDICINE

## 2025-08-19 PROCEDURE — 3008F BODY MASS INDEX DOCD: CPT | Performed by: INTERNAL MEDICINE

## 2025-08-19 PROCEDURE — 3074F SYST BP LT 130 MM HG: CPT | Performed by: INTERNAL MEDICINE

## 2025-09-01 DIAGNOSIS — E11.9 TYPE 2 DIABETES MELLITUS WITHOUT COMPLICATION, WITHOUT LONG-TERM CURRENT USE OF INSULIN: ICD-10-CM

## 2025-09-02 RX ORDER — METFORMIN HYDROCHLORIDE 500 MG/1
TABLET, EXTENDED RELEASE ORAL
Qty: 270 TABLET | Refills: 0 | Status: SHIPPED | OUTPATIENT
Start: 2025-09-02

## 2025-09-11 ENCOUNTER — APPOINTMENT (OUTPATIENT)
Dept: PRIMARY CARE | Facility: CLINIC | Age: 70
End: 2025-09-11
Payer: MEDICARE

## 2025-11-21 ENCOUNTER — APPOINTMENT (OUTPATIENT)
Dept: RHEUMATOLOGY | Facility: CLINIC | Age: 70
End: 2025-11-21
Payer: MEDICARE

## 2025-12-11 ENCOUNTER — APPOINTMENT (OUTPATIENT)
Dept: PRIMARY CARE | Facility: CLINIC | Age: 70
End: 2025-12-11
Payer: MEDICARE

## 2026-03-19 ENCOUNTER — APPOINTMENT (OUTPATIENT)
Dept: UROLOGY | Facility: CLINIC | Age: 71
End: 2026-03-19
Payer: MEDICARE